# Patient Record
Sex: FEMALE | Race: ASIAN | NOT HISPANIC OR LATINO | Employment: OTHER | URBAN - METROPOLITAN AREA
[De-identification: names, ages, dates, MRNs, and addresses within clinical notes are randomized per-mention and may not be internally consistent; named-entity substitution may affect disease eponyms.]

---

## 2024-08-14 ENCOUNTER — APPOINTMENT (OUTPATIENT)
Dept: RADIOLOGY | Facility: HOSPITAL | Age: 64
End: 2024-08-14
Attending: STUDENT IN AN ORGANIZED HEALTH CARE EDUCATION/TRAINING PROGRAM
Payer: COMMERCIAL

## 2024-08-14 ENCOUNTER — HOSPITAL ENCOUNTER (INPATIENT)
Facility: HOSPITAL | Age: 64
LOS: 3 days | Discharge: HOME | End: 2024-08-17
Attending: STUDENT IN AN ORGANIZED HEALTH CARE EDUCATION/TRAINING PROGRAM | Admitting: STUDENT IN AN ORGANIZED HEALTH CARE EDUCATION/TRAINING PROGRAM
Payer: COMMERCIAL

## 2024-08-14 ENCOUNTER — ANESTHESIA EVENT (OUTPATIENT)
Dept: SURGERY | Facility: HOSPITAL | Age: 64
End: 2024-08-14
Payer: COMMERCIAL

## 2024-08-14 ENCOUNTER — ANESTHESIA (OUTPATIENT)
Dept: SURGERY | Facility: HOSPITAL | Age: 64
End: 2024-08-14
Payer: COMMERCIAL

## 2024-08-14 DIAGNOSIS — N20.1 URETERAL STONE: ICD-10-CM

## 2024-08-14 DIAGNOSIS — N17.9 AKI (ACUTE KIDNEY INJURY) (H): ICD-10-CM

## 2024-08-14 LAB
ALBUMIN UR-MCNC: NEGATIVE MG/DL
ANION GAP SERPL CALCULATED.3IONS-SCNC: 12 MMOL/L (ref 7–15)
APPEARANCE UR: CLEAR
BACTERIA #/AREA URNS HPF: ABNORMAL /HPF
BASOPHILS # BLD AUTO: 0 10E3/UL (ref 0–0.2)
BASOPHILS NFR BLD AUTO: 0 %
BILIRUB UR QL STRIP: NEGATIVE
BUN SERPL-MCNC: 46.1 MG/DL (ref 8–23)
CALCIUM SERPL-MCNC: 9 MG/DL (ref 8.8–10.4)
CHLORIDE SERPL-SCNC: 100 MMOL/L (ref 98–107)
COLOR UR AUTO: COLORLESS
CREAT SERPL-MCNC: 4.48 MG/DL (ref 0.51–0.95)
CRP SERPL-MCNC: 26.6 MG/L
EGFRCR SERPLBLD CKD-EPI 2021: 10 ML/MIN/1.73M2
EOSINOPHIL # BLD AUTO: 0.2 10E3/UL (ref 0–0.7)
EOSINOPHIL NFR BLD AUTO: 2 %
ERYTHROCYTE [DISTWIDTH] IN BLOOD BY AUTOMATED COUNT: 12.1 % (ref 10–15)
GLUCOSE BLDC GLUCOMTR-MCNC: 100 MG/DL (ref 70–99)
GLUCOSE SERPL-MCNC: 103 MG/DL (ref 70–99)
GLUCOSE UR STRIP-MCNC: NEGATIVE MG/DL
HCO3 SERPL-SCNC: 24 MMOL/L (ref 22–29)
HCT VFR BLD AUTO: 36.6 % (ref 35–47)
HGB BLD-MCNC: 12.2 G/DL (ref 11.7–15.7)
HGB UR QL STRIP: ABNORMAL
HOLD SPECIMEN: NORMAL
HOLD SPECIMEN: NORMAL
IMM GRANULOCYTES # BLD: 0 10E3/UL
IMM GRANULOCYTES NFR BLD: 0 %
KETONES UR STRIP-MCNC: NEGATIVE MG/DL
LEUKOCYTE ESTERASE UR QL STRIP: ABNORMAL
LYMPHOCYTES # BLD AUTO: 1.3 10E3/UL (ref 0.8–5.3)
LYMPHOCYTES NFR BLD AUTO: 15 %
MCH RBC QN AUTO: 29.1 PG (ref 26.5–33)
MCHC RBC AUTO-ENTMCNC: 33.3 G/DL (ref 31.5–36.5)
MCV RBC AUTO: 87 FL (ref 78–100)
MONOCYTES # BLD AUTO: 0.7 10E3/UL (ref 0–1.3)
MONOCYTES NFR BLD AUTO: 8 %
NEUTROPHILS # BLD AUTO: 6.3 10E3/UL (ref 1.6–8.3)
NEUTROPHILS NFR BLD AUTO: 74 %
NITRATE UR QL: NEGATIVE
NRBC # BLD AUTO: 0 10E3/UL
NRBC BLD AUTO-RTO: 0 /100
PH UR STRIP: 6 [PH] (ref 5–7)
PLATELET # BLD AUTO: 166 10E3/UL (ref 150–450)
POTASSIUM SERPL-SCNC: 4.6 MMOL/L (ref 3.4–5.3)
RBC # BLD AUTO: 4.19 10E6/UL (ref 3.8–5.2)
RBC URINE: <1 /HPF
SODIUM SERPL-SCNC: 136 MMOL/L (ref 135–145)
SP GR UR STRIP: 1.01 (ref 1–1.03)
SQUAMOUS EPITHELIAL: 2 /HPF
UROBILINOGEN UR STRIP-MCNC: <2 MG/DL
WBC # BLD AUTO: 8.5 10E3/UL (ref 4–11)
WBC URINE: 5 /HPF

## 2024-08-14 PROCEDURE — 360N000082 HC SURGERY LEVEL 2 W/ FLUORO, PER MIN: Performed by: STUDENT IN AN ORGANIZED HEALTH CARE EDUCATION/TRAINING PROGRAM

## 2024-08-14 PROCEDURE — 250N000011 HC RX IP 250 OP 636: Performed by: ANESTHESIOLOGY

## 2024-08-14 PROCEDURE — 250N000013 HC RX MED GY IP 250 OP 250 PS 637: Performed by: STUDENT IN AN ORGANIZED HEALTH CARE EDUCATION/TRAINING PROGRAM

## 2024-08-14 PROCEDURE — 999N000180 XR SURGERY CARM FLUORO LESS THAN 5 MIN: Mod: TC

## 2024-08-14 PROCEDURE — 52332 CYSTOSCOPY AND TREATMENT: CPT | Mod: 50 | Performed by: STUDENT IN AN ORGANIZED HEALTH CARE EDUCATION/TRAINING PROGRAM

## 2024-08-14 PROCEDURE — 99223 1ST HOSP IP/OBS HIGH 75: CPT | Performed by: STUDENT IN AN ORGANIZED HEALTH CARE EDUCATION/TRAINING PROGRAM

## 2024-08-14 PROCEDURE — 99222 1ST HOSP IP/OBS MODERATE 55: CPT | Mod: 25 | Performed by: STUDENT IN AN ORGANIZED HEALTH CARE EDUCATION/TRAINING PROGRAM

## 2024-08-14 PROCEDURE — 120N000001 HC R&B MED SURG/OB

## 2024-08-14 PROCEDURE — 87086 URINE CULTURE/COLONY COUNT: CPT | Performed by: STUDENT IN AN ORGANIZED HEALTH CARE EDUCATION/TRAINING PROGRAM

## 2024-08-14 PROCEDURE — 272N000001 HC OR GENERAL SUPPLY STERILE: Performed by: STUDENT IN AN ORGANIZED HEALTH CARE EDUCATION/TRAINING PROGRAM

## 2024-08-14 PROCEDURE — 52332 CYSTOSCOPY AND TREATMENT: CPT | Performed by: NURSE ANESTHETIST, CERTIFIED REGISTERED

## 2024-08-14 PROCEDURE — 250N000011 HC RX IP 250 OP 636: Performed by: STUDENT IN AN ORGANIZED HEALTH CARE EDUCATION/TRAINING PROGRAM

## 2024-08-14 PROCEDURE — 81001 URINALYSIS AUTO W/SCOPE: CPT | Performed by: STUDENT IN AN ORGANIZED HEALTH CARE EDUCATION/TRAINING PROGRAM

## 2024-08-14 PROCEDURE — 255N000002 HC RX 255 OP 636: Performed by: STUDENT IN AN ORGANIZED HEALTH CARE EDUCATION/TRAINING PROGRAM

## 2024-08-14 PROCEDURE — 370N000017 HC ANESTHESIA TECHNICAL FEE, PER MIN: Performed by: STUDENT IN AN ORGANIZED HEALTH CARE EDUCATION/TRAINING PROGRAM

## 2024-08-14 PROCEDURE — 250N000009 HC RX 250: Performed by: STUDENT IN AN ORGANIZED HEALTH CARE EDUCATION/TRAINING PROGRAM

## 2024-08-14 PROCEDURE — 36415 COLL VENOUS BLD VENIPUNCTURE: CPT | Performed by: STUDENT IN AN ORGANIZED HEALTH CARE EDUCATION/TRAINING PROGRAM

## 2024-08-14 PROCEDURE — C2617 STENT, NON-COR, TEM W/O DEL: HCPCS | Performed by: STUDENT IN AN ORGANIZED HEALTH CARE EDUCATION/TRAINING PROGRAM

## 2024-08-14 PROCEDURE — 258N000001 HC RX 258: Performed by: STUDENT IN AN ORGANIZED HEALTH CARE EDUCATION/TRAINING PROGRAM

## 2024-08-14 PROCEDURE — 74420 UROGRAPHY RTRGR +-KUB: CPT | Mod: 26 | Performed by: STUDENT IN AN ORGANIZED HEALTH CARE EDUCATION/TRAINING PROGRAM

## 2024-08-14 PROCEDURE — 86140 C-REACTIVE PROTEIN: CPT | Performed by: STUDENT IN AN ORGANIZED HEALTH CARE EDUCATION/TRAINING PROGRAM

## 2024-08-14 PROCEDURE — 0T788DZ DILATION OF BILATERAL URETERS WITH INTRALUMINAL DEVICE, VIA NATURAL OR ARTIFICIAL OPENING ENDOSCOPIC: ICD-10-PCS | Performed by: STUDENT IN AN ORGANIZED HEALTH CARE EDUCATION/TRAINING PROGRAM

## 2024-08-14 PROCEDURE — 85025 COMPLETE CBC W/AUTO DIFF WBC: CPT | Performed by: STUDENT IN AN ORGANIZED HEALTH CARE EDUCATION/TRAINING PROGRAM

## 2024-08-14 PROCEDURE — 80048 BASIC METABOLIC PNL TOTAL CA: CPT | Performed by: STUDENT IN AN ORGANIZED HEALTH CARE EDUCATION/TRAINING PROGRAM

## 2024-08-14 PROCEDURE — 99285 EMERGENCY DEPT VISIT HI MDM: CPT

## 2024-08-14 PROCEDURE — C1769 GUIDE WIRE: HCPCS | Performed by: STUDENT IN AN ORGANIZED HEALTH CARE EDUCATION/TRAINING PROGRAM

## 2024-08-14 PROCEDURE — 258N000003 HC RX IP 258 OP 636: Performed by: ANESTHESIOLOGY

## 2024-08-14 PROCEDURE — 999N000141 HC STATISTIC PRE-PROCEDURE NURSING ASSESSMENT: Performed by: STUDENT IN AN ORGANIZED HEALTH CARE EDUCATION/TRAINING PROGRAM

## 2024-08-14 PROCEDURE — 52332 CYSTOSCOPY AND TREATMENT: CPT | Performed by: ANESTHESIOLOGY

## 2024-08-14 PROCEDURE — 250N000009 HC RX 250: Performed by: ANESTHESIOLOGY

## 2024-08-14 DEVICE — URETERAL STENT
Type: IMPLANTABLE DEVICE | Site: URETER | Status: NON-FUNCTIONAL
Brand: PERCUFLEX™ PLUS
Removed: 2024-09-12

## 2024-08-14 RX ORDER — FENTANYL CITRATE 50 UG/ML
INJECTION, SOLUTION INTRAMUSCULAR; INTRAVENOUS PRN
Status: DISCONTINUED | OUTPATIENT
Start: 2024-08-14 | End: 2024-08-14

## 2024-08-14 RX ORDER — OXYCODONE HYDROCHLORIDE 5 MG/1
10 TABLET ORAL
Status: DISCONTINUED | OUTPATIENT
Start: 2024-08-14 | End: 2024-08-14 | Stop reason: HOSPADM

## 2024-08-14 RX ORDER — ONDANSETRON 2 MG/ML
4 INJECTION INTRAMUSCULAR; INTRAVENOUS EVERY 6 HOURS PRN
Status: DISCONTINUED | OUTPATIENT
Start: 2024-08-14 | End: 2024-08-17 | Stop reason: HOSPADM

## 2024-08-14 RX ORDER — POLYETHYLENE GLYCOL 3350 17 G/17G
17 POWDER, FOR SOLUTION ORAL 2 TIMES DAILY PRN
Status: DISCONTINUED | OUTPATIENT
Start: 2024-08-14 | End: 2024-08-17 | Stop reason: HOSPADM

## 2024-08-14 RX ORDER — FENTANYL CITRATE 50 UG/ML
25 INJECTION, SOLUTION INTRAMUSCULAR; INTRAVENOUS EVERY 5 MIN PRN
Status: DISCONTINUED | OUTPATIENT
Start: 2024-08-14 | End: 2024-08-14 | Stop reason: HOSPADM

## 2024-08-14 RX ORDER — LIDOCAINE 40 MG/G
CREAM TOPICAL
Status: DISCONTINUED | OUTPATIENT
Start: 2024-08-14 | End: 2024-08-14 | Stop reason: HOSPADM

## 2024-08-14 RX ORDER — OXYCODONE HYDROCHLORIDE 5 MG/1
5 TABLET ORAL
Status: DISCONTINUED | OUTPATIENT
Start: 2024-08-14 | End: 2024-08-14 | Stop reason: HOSPADM

## 2024-08-14 RX ORDER — AMOXICILLIN 250 MG
2 CAPSULE ORAL 2 TIMES DAILY PRN
Status: DISCONTINUED | OUTPATIENT
Start: 2024-08-14 | End: 2024-08-17 | Stop reason: HOSPADM

## 2024-08-14 RX ORDER — ACETAMINOPHEN 650 MG/1
650 SUPPOSITORY RECTAL EVERY 4 HOURS PRN
Status: DISCONTINUED | OUTPATIENT
Start: 2024-08-14 | End: 2024-08-17 | Stop reason: HOSPADM

## 2024-08-14 RX ORDER — SODIUM CHLORIDE, SODIUM LACTATE, POTASSIUM CHLORIDE, CALCIUM CHLORIDE 600; 310; 30; 20 MG/100ML; MG/100ML; MG/100ML; MG/100ML
INJECTION, SOLUTION INTRAVENOUS CONTINUOUS
Status: DISCONTINUED | OUTPATIENT
Start: 2024-08-14 | End: 2024-08-14 | Stop reason: HOSPADM

## 2024-08-14 RX ORDER — LIDOCAINE 40 MG/G
CREAM TOPICAL
Status: DISCONTINUED | OUTPATIENT
Start: 2024-08-14 | End: 2024-08-17 | Stop reason: HOSPADM

## 2024-08-14 RX ORDER — CEFAZOLIN SODIUM/WATER 2 G/20 ML
2 SYRINGE (ML) INTRAVENOUS
Status: COMPLETED | OUTPATIENT
Start: 2024-08-14 | End: 2024-08-14

## 2024-08-14 RX ORDER — ONDANSETRON 2 MG/ML
INJECTION INTRAMUSCULAR; INTRAVENOUS PRN
Status: DISCONTINUED | OUTPATIENT
Start: 2024-08-14 | End: 2024-08-14

## 2024-08-14 RX ORDER — HYDRALAZINE HYDROCHLORIDE 20 MG/ML
10 INJECTION INTRAMUSCULAR; INTRAVENOUS EVERY 4 HOURS PRN
Status: DISCONTINUED | OUTPATIENT
Start: 2024-08-14 | End: 2024-08-17 | Stop reason: HOSPADM

## 2024-08-14 RX ORDER — DEXAMETHASONE SODIUM PHOSPHATE 4 MG/ML
4 INJECTION, SOLUTION INTRA-ARTICULAR; INTRALESIONAL; INTRAMUSCULAR; INTRAVENOUS; SOFT TISSUE
Status: DISCONTINUED | OUTPATIENT
Start: 2024-08-14 | End: 2024-08-14 | Stop reason: HOSPADM

## 2024-08-14 RX ORDER — ONDANSETRON 4 MG/1
4 TABLET, ORALLY DISINTEGRATING ORAL EVERY 6 HOURS PRN
Status: DISCONTINUED | OUTPATIENT
Start: 2024-08-14 | End: 2024-08-17 | Stop reason: HOSPADM

## 2024-08-14 RX ORDER — NALOXONE HYDROCHLORIDE 1 MG/ML
0.1 INJECTION INTRAMUSCULAR; INTRAVENOUS; SUBCUTANEOUS
Status: DISCONTINUED | OUTPATIENT
Start: 2024-08-14 | End: 2024-08-14 | Stop reason: HOSPADM

## 2024-08-14 RX ORDER — ACETAMINOPHEN 325 MG/1
650 TABLET ORAL EVERY 4 HOURS PRN
Status: DISCONTINUED | OUTPATIENT
Start: 2024-08-14 | End: 2024-08-17 | Stop reason: HOSPADM

## 2024-08-14 RX ORDER — HYDRALAZINE HYDROCHLORIDE 10 MG/1
10 TABLET, FILM COATED ORAL EVERY 4 HOURS PRN
Status: DISCONTINUED | OUTPATIENT
Start: 2024-08-14 | End: 2024-08-17 | Stop reason: HOSPADM

## 2024-08-14 RX ORDER — ONDANSETRON 2 MG/ML
4 INJECTION INTRAMUSCULAR; INTRAVENOUS EVERY 30 MIN PRN
Status: DISCONTINUED | OUTPATIENT
Start: 2024-08-14 | End: 2024-08-14 | Stop reason: HOSPADM

## 2024-08-14 RX ORDER — ONDANSETRON 4 MG/1
4 TABLET, ORALLY DISINTEGRATING ORAL EVERY 30 MIN PRN
Status: DISCONTINUED | OUTPATIENT
Start: 2024-08-14 | End: 2024-08-14 | Stop reason: HOSPADM

## 2024-08-14 RX ORDER — PROPOFOL 10 MG/ML
INJECTION, EMULSION INTRAVENOUS PRN
Status: DISCONTINUED | OUTPATIENT
Start: 2024-08-14 | End: 2024-08-14

## 2024-08-14 RX ORDER — CEFAZOLIN SODIUM/WATER 2 G/20 ML
2 SYRINGE (ML) INTRAVENOUS SEE ADMIN INSTRUCTIONS
Status: DISCONTINUED | OUTPATIENT
Start: 2024-08-14 | End: 2024-08-14 | Stop reason: HOSPADM

## 2024-08-14 RX ORDER — LIDOCAINE HYDROCHLORIDE 10 MG/ML
INJECTION, SOLUTION INFILTRATION; PERINEURAL PRN
Status: DISCONTINUED | OUTPATIENT
Start: 2024-08-14 | End: 2024-08-14

## 2024-08-14 RX ORDER — ACETAMINOPHEN 650 MG/1
650 SUPPOSITORY RECTAL ONCE
Status: COMPLETED | OUTPATIENT
Start: 2024-08-14 | End: 2024-08-14

## 2024-08-14 RX ORDER — PROPOFOL 10 MG/ML
INJECTION, EMULSION INTRAVENOUS CONTINUOUS PRN
Status: DISCONTINUED | OUTPATIENT
Start: 2024-08-14 | End: 2024-08-14

## 2024-08-14 RX ORDER — FENTANYL CITRATE 50 UG/ML
50 INJECTION, SOLUTION INTRAMUSCULAR; INTRAVENOUS EVERY 5 MIN PRN
Status: DISCONTINUED | OUTPATIENT
Start: 2024-08-14 | End: 2024-08-14 | Stop reason: HOSPADM

## 2024-08-14 RX ORDER — AMOXICILLIN 250 MG
1 CAPSULE ORAL 2 TIMES DAILY PRN
Status: DISCONTINUED | OUTPATIENT
Start: 2024-08-14 | End: 2024-08-17 | Stop reason: HOSPADM

## 2024-08-14 RX ORDER — CALCIUM CARBONATE 500 MG/1
1000 TABLET, CHEWABLE ORAL 4 TIMES DAILY PRN
Status: DISCONTINUED | OUTPATIENT
Start: 2024-08-14 | End: 2024-08-17 | Stop reason: HOSPADM

## 2024-08-14 RX ORDER — ACETAMINOPHEN 325 MG/1
975 TABLET ORAL ONCE
Status: COMPLETED | OUTPATIENT
Start: 2024-08-14 | End: 2024-08-14

## 2024-08-14 RX ADMIN — LIDOCAINE HYDROCHLORIDE 2 ML: 10 INJECTION, SOLUTION INFILTRATION; PERINEURAL at 18:04

## 2024-08-14 RX ADMIN — ACETAMINOPHEN 975 MG: 325 TABLET ORAL at 17:50

## 2024-08-14 RX ADMIN — FENTANYL CITRATE 50 MCG: 50 INJECTION INTRAMUSCULAR; INTRAVENOUS at 18:15

## 2024-08-14 RX ADMIN — Medication 2 G: at 18:03

## 2024-08-14 RX ADMIN — SODIUM CHLORIDE, POTASSIUM CHLORIDE, SODIUM LACTATE AND CALCIUM CHLORIDE: 600; 310; 30; 20 INJECTION, SOLUTION INTRAVENOUS at 17:52

## 2024-08-14 RX ADMIN — ONDANSETRON 4 MG: 2 INJECTION INTRAMUSCULAR; INTRAVENOUS at 18:11

## 2024-08-14 RX ADMIN — PROPOFOL 75 MCG/KG/MIN: 10 INJECTION, EMULSION INTRAVENOUS at 18:11

## 2024-08-14 RX ADMIN — PROPOFOL 50 MG: 10 INJECTION, EMULSION INTRAVENOUS at 18:04

## 2024-08-14 ASSESSMENT — COLUMBIA-SUICIDE SEVERITY RATING SCALE - C-SSRS
2. HAVE YOU ACTUALLY HAD ANY THOUGHTS OF KILLING YOURSELF IN THE PAST MONTH?: NO
1. IN THE PAST MONTH, HAVE YOU WISHED YOU WERE DEAD OR WISHED YOU COULD GO TO SLEEP AND NOT WAKE UP?: NO
6. HAVE YOU EVER DONE ANYTHING, STARTED TO DO ANYTHING, OR PREPARED TO DO ANYTHING TO END YOUR LIFE?: NO

## 2024-08-14 ASSESSMENT — ACTIVITIES OF DAILY LIVING (ADL)
ADLS_ACUITY_SCORE: 35

## 2024-08-14 NOTE — ED NOTES
Expected Patient Referral to ED  10:50 AM    Referring Clinic/Provider:  Stevie SANCHEZ    Reason for referral/Clinical facts:  3-4 days of flank pain, 20 mm ureteral stone with hydro. ISIAH with elevated CR. Vitals are normal and pain well controlled. Needs admission for IVF and urology consult. Tried direct admit but no beds.     Recommendations provided:  Send to ED for further evaluation    Caller was informed that this institution does possess the capabilities and/or resources to provide for patient and should be transferred to our facility.    Discussed that if direct admit is sought and any hurdles are encountered, this ED would be happy to see the patient and evaluate.    Informed caller that recommendations provided are recommendations based only on the facts provided and that they responsible to accept or reject the advice, or to seek a formal in person consultation as needed and that this ED will see/treat patient should they arrive.      LINWOOD NORIEGA MD  [facility] EMERGENCY DEPARTMENT  [address] [place] [id number]  [phone]       Linwood Noriega MD  08/14/24 4742

## 2024-08-14 NOTE — H&P
[facility]    History and Physical - Hospitalist Service       Date of Admission:  8/14/2024    Assessment & Plan      Zoraida Marie is a 64 year old female with a medical history of nephrolithiasis who is admitted on 8/14/2024 with right hydronephrosis secondary to large obstructing stone.  Case is complicated by ISIAH.    Right hydronephrosis  Obstructing UPJ stone  -Consult urology, plan on stent placement this evening  -stone work-up per urology   -N.p.o. prior to procedure, advance as tolerated thereafter  -Trend creatinine with morning labs  -Pain control with Tylenol  -Withhold antibiotics unless fevers or other signs and symptoms of infection develop  -urine cx pending from OR     ISIAH  -secondary to obstructive uropathy, anticipate resolution s/p stent placement  -bladder scan as needed   -advise good oral intake post-procedure     Chronic conditions: patient denies chronic conditions, does not take any scheduled meds at home           Diet: Renal Diet (non-dialysis)    DVT Prophylaxis: SCD  Ackerman Catheter: Not present  Lines: None     Cardiac Monitoring: None  Code Status: Full Code      Clinically Significant Risk Factors Present on Admission                                           Disposition Plan     Medically Ready for Discharge: Anticipated Tomorrow           Otf Layton DO  Hospitalist Service  [facility]  Securely message with BeehiveID (more info)  Text page via MeBeam Paging/Directory     ______________________________________________________________________    Chief Complaint   Flank pain    History is obtained from the patient    History of Present Illness   Zoraida Marie is a 64 year old female with known medical history of nephrolithiasis otherwise without any significant past medical history who is presenting with severe right-sided flank pain that began this past Sunday.  The pain was progressive and ultimately brought patient into the ER today.  He  describes the pain as a intermittently sharp and constantly dull sensation that did not improve or worsen with anything in particular.  The pain was located on her right flank and radiated towards her groin.  She states some slight dull pain on left side as well.  Patient does admit to subjective fevers at home however on further questioning states this may have been related to the pain itself.  She denies any chills and diaphoresis throughout this time.  Denies any nausea or vomiting.    Past Medical History    History reviewed. No pertinent past medical history.    Past Surgical History   History reviewed. No pertinent surgical history.    Prior to Admission Medications   None        Social History   I have reviewed this patient's social history and updated it with pertinent information if needed.  Social History     Tobacco Use    Smoking status: Never    Smokeless tobacco: Never   Substance Use Topics    Alcohol use: Never    Drug use: Never         Family History         Allergies   No Known Allergies     Physical Exam   Vital Signs: Temp: 99.2  F (37.3  C) Temp src: Oral BP: 110/57 Pulse: 68   Resp: 18 SpO2: 96 % O2 Device: None (Room air)    Weight: 115 lbs 0 oz    General Appearance: Comfortable appearing, nontoxic  Respiratory: CTAB, breathing comfortably on room air  Cardiovascular: RRR without murmur  GI: No CVA tenderness, no abdominal pain, no lymphadenopathy  Skin: No exposed rash  Other: Alert and oriented    Medical Decision Making       75 MINUTES SPENT BY ME on the date of service doing chart review, history, exam, documentation & further activities per the note.      Data     I have personally reviewed the following data over the past 24 hrs:    8.5  \   12.2   / 166     136 100 46.1 (H) /  100 (H)   4.6 24 4.48 (H) \     Procal: N/A CRP: 26.60 (H) Lactic Acid: N/A         Imaging results reviewed over the past 24 hrs:   Recent Results (from the past 24 hour(s))   CT ABDOMEN PELVIS STONE PROTOCOL  WO    Narrative    For Patients: As a result of the 21st Century Cures Act, medical imaging exams and procedure reports are released immediately into your electronic medical record. You may view this report before your referring provider. If you have questions, please contact your health care provider.    EXAM: CT ABDOMEN PELVIS STONE PROTOCOL WO  LOCATION: [facility]  DATE: 8/14/2024    INDICATION: Flank pain, kidney stone suspected  COMPARISON: None.  TECHNIQUE: CT scan of the abdomen and pelvis was performed without oral or IV contrast. Multiplanar reformats were obtained. Dose reduction techniques were used.  CONTRAST: None.    FINDINGS:   LOWER CHEST: Normal.    HEPATOBILIARY: Normal.    PANCREAS: Normal.    SPLEEN: Normal.    ADRENAL GLANDS: Normal.    KIDNEY/BLADDER: Right hydronephrosis extending to the ureteropelvic junction, where there is a 20 mm x 7 mm x 9 mm calculus. There is a 15 mm x 15 mm x 18 mm calculus at the upper pole of the right kidney. There is low-attenuation in the upper pole of the right kidney. The left kidney is mildly atrophic. There is a 28 mm x 12 mm x 15 mm left renal pelvis calculus. No definite hydronephrosis or hydroureter.    BOWEL: Normal.    LYMPH NODES: Normal.    VASCULATURE: Normal.    PELVIC ORGANS: There is a 1.7 cm low-attenuation left ovarian lesion.    OTHER: There is a small amount of free fluid in the pelvis.    MUSCULOSKELETAL: Normal.    Impression    1.  Right hydronephrosis extending to the ureteropelvic junction, where there is a 20 mm x 7 mm x 9 mm calculus.  2.  Large bilateral renal calculi. Low-attenuation at the upper pole of the right kidney could be seen with focal obstruction.  3.  A 1.7 cm low-attenuation left ovarian lesion is likely a benign cyst. No follow-up is required.  4.  A small amount of free fluid in the pelvis is nonspecific. This is abnormal.   XR Surgery CAROLINA Fluoro L/T 5 Min    Narrative    This exam was marked as  non-reportable because it will not be read by a   radiologist or a [facility] [place] non-radiologist provider.

## 2024-08-14 NOTE — CONSULTS
Urology Consult    Name:  Zoraida Marie  MRN:  [id number]  Age/: 64 year old, 1960    CC: right flank pain     HPI: Zoraida Marie is a(n) 64 year old female with h/o right flank pain.  She had a recent onset of right-sided flank pain for the last few days and was seen at the urgent care earlier today and then referred to the ER.  CT was done in urgent care showing bilateral kidney stones obstructing on the right side and then without significant hydronephrosis but obstructing on the left side.  Was found to have an elevated creatinine.  Her baseline creatinine from  is at 1.77 and currently it was at 4.4  She was evaluated about a year ago at [facility] for kidney stones and had an ultrasound done which showed left-sided hydronephrosis but no kidney stones were seen at that time  No prior history of kidney stone procedures      Past Medical History:  History reviewed. No pertinent past medical history.    Past Surgical History:  History reviewed. No pertinent surgical history.    Allergies:   No Known Allergies    Medications:  Current Facility-Administered Medications   Medication Dose Route Frequency Provider Last Rate Last Admin    acetaminophen (TYLENOL) tablet 650 mg  650 mg Oral Q4H PRN Layton, Otf, DO        Or    acetaminophen (TYLENOL) Suppository 650 mg  650 mg Rectal Q4H PRN Layton, Otf, DO        calcium carbonate (TUMS) chewable tablet 1,000 mg  1,000 mg Oral 4x Daily PRN Layton, Otf, DO        hydrALAZINE (APRESOLINE) tablet 10 mg  10 mg Oral Q4H PRN Layton, Otf, DO        Or    hydrALAZINE (APRESOLINE) injection 10 mg  10 mg Intravenous Q4H PRN Layton, Otf, DO        lidocaine (LMX4) cream   Topical Q1H PRN Layton, Otf, DO        lidocaine 1 % 0.1-1 mL  0.1-1 mL Other Q1H PRN Layton, Otf, DO        melatonin tablet 5 mg  5 mg Oral At Bedtime PRN Layton, Otf, DO        ondansetron (ZOFRAN ODT) ODT tab 4 mg  4 mg Oral Q6H PRN Layton, Otf, DO        Or    ondansetron  (ZOFRAN) injection 4 mg  4 mg Intravenous Q6H PRN Layton, Otf, DO        polyethylene glycol (MIRALAX) Packet 17 g  17 g Oral BID PRN Layton, Otf, DO        senna-docusate (SENOKOT-S/PERICOLACE) 8.6-50 MG per tablet 1 tablet  1 tablet Oral BID PRN Layton, Otf, DO        Or    senna-docusate (SENOKOT-S/PERICOLACE) 8.6-50 MG per tablet 2 tablet  2 tablet Oral BID PRN Layton, Otf, DO        sodium chloride (PF) 0.9% PF flush 3 mL  3 mL Intracatheter Q8H Layton, Otf, DO        sodium chloride (PF) 0.9% PF flush 3 mL  3 mL Intracatheter q1 min prn Layton, Otf, DO           Social History:  Social History     Socioeconomic History    Marital status:      Spouse name: Not on file    Number of children: Not on file    Years of education: Not on file    Highest education level: Not on file   Occupational History    Not on file   Tobacco Use    Smoking status: Never    Smokeless tobacco: Never   Substance and Sexual Activity    Alcohol use: Never    Drug use: Never    Sexual activity: Not on file   Other Topics Concern    Not on file   Social History Narrative    Not on file     Social Determinants of Health     Financial Resource Strain: Not on file   Food Insecurity: Not on file   Transportation Needs: Not on file   Physical Activity: Not on file   Stress: Not on file   Social Connections: Unknown (7/12/2023)    Received from [facility]    Social Connections     Frequency of Communication with Friends and Family: Not on file   Interpersonal Safety: Not on file   Housing Stability: Not on file       Family History:  History reviewed. No pertinent family history.    ROS:  The remainder of the complete ROS was negative unless noted in the HPI.    Exam:  /62 (BP Location: Left arm)   Pulse 80   Temp 99.4  F (37.4  C) (Oral)   Resp 16   Wt 52.2 kg (115 lb)   SpO2 96%   General: Alert, interactive, & in NAD  Resp: CTAB, no crackles or wheezes  Cardiac:  Regular rate; extremities warm;   Abdomen: Soft, nontender, nondistended. .  : Normal   Extremities: No LE edema or obvious joint abnormalities  Skin: Warm and dry, no jaundice or rash    Labs:  Results for orders placed or performed during the hospital encounter of 08/14/24 (from the past 24 hour(s))   UA with Microscopic reflex to Culture    Specimen: Urine, Clean Catch   Result Value Ref Range    Color Urine Colorless Colorless, Straw, Light Yellow, Yellow    Appearance Urine Clear Clear    Glucose Urine Negative Negative mg/dL    Bilirubin Urine Negative Negative    Ketones Urine Negative Negative mg/dL    Specific Gravity Urine 1.006 1.001 - 1.030    Blood Urine 0.06 mg/dL (A) Negative    pH Urine 6.0 5.0 - 7.0    Protein Albumin Urine Negative Negative mg/dL    Urobilinogen Urine <2.0 <2.0 mg/dL    Nitrite Urine Negative Negative    Leukocyte Esterase Urine 75 Kallie/uL (A) Negative    Bacteria Urine Few (A) None Seen /HPF    RBC Urine <1 <=2 /HPF    WBC Urine 5 <=5 /HPF    Squamous Epithelials Urine 2 (H) <=1 /HPF    Narrative    Urine Culture not indicated   CBC with Platelets & Differential    Narrative    The following orders were created for panel order CBC with Platelets & Differential.  Procedure                               Abnormality         Status                     ---------                               -----------         ------                     CBC with platelets and d...[[id number]]                      Final result                 Please view results for these tests on the individual orders.   Basic metabolic panel   Result Value Ref Range    Sodium 136 135 - 145 mmol/L    Potassium 4.6 3.4 - 5.3 mmol/L    Chloride 100 98 - 107 mmol/L    Carbon Dioxide (CO2) 24 22 - 29 mmol/L    Anion Gap 12 7 - 15 mmol/L    Urea Nitrogen 46.1 (H) 8.0 - 23.0 mg/dL    Creatinine 4.48 (H) 0.51 - 0.95 mg/dL    GFR Estimate 10 (L) >60 mL/min/1.73m2    Calcium 9.0 8.8 - 10.4 mg/dL    Glucose 103 (H) 70 - 99  mg/dL   [place] Draw    Narrative    The following orders were created for panel order [place] Draw.  Procedure                               Abnormality         Status                     ---------                               -----------         ------                     Extra Red Top Tube[[id number]]                               Final result               Extra Green Top (Lithium...[[id number]]                                                 Extra Purple Top Tube[[id number]]                                                         Please view results for these tests on the individual orders.   CBC with platelets and differential   Result Value Ref Range    WBC Count 8.5 4.0 - 11.0 10e3/uL    RBC Count 4.19 3.80 - 5.20 10e6/uL    Hemoglobin 12.2 11.7 - 15.7 g/dL    Hematocrit 36.6 35.0 - 47.0 %    MCV 87 78 - 100 fL    MCH 29.1 26.5 - 33.0 pg    MCHC 33.3 31.5 - 36.5 g/dL    RDW 12.1 10.0 - 15.0 %    Platelet Count 166 150 - 450 10e3/uL    % Neutrophils 74 %    % Lymphocytes 15 %    % Monocytes 8 %    % Eosinophils 2 %    % Basophils 0 %    % Immature Granulocytes 0 %    NRBCs per 100 WBC 0 <1 /100    Absolute Neutrophils 6.3 1.6 - 8.3 10e3/uL    Absolute Lymphocytes 1.3 0.8 - 5.3 10e3/uL    Absolute Monocytes 0.7 0.0 - 1.3 10e3/uL    Absolute Eosinophils 0.2 0.0 - 0.7 10e3/uL    Absolute Basophils 0.0 0.0 - 0.2 10e3/uL    Absolute Immature Granulocytes 0.0 <=0.4 10e3/uL    Absolute NRBCs 0.0 10e3/uL   Extra Red Top Tube   Result Value Ref Range    Hold Specimen JIC    Extra Tube ([place] Draw)    Narrative    The following orders were created for panel order Extra Tube ([place] Draw).  Procedure                               Abnormality         Status                     ---------                               -----------         ------                     Extra Blue Top Tube[[id number]]                              Final result                 Please view results for these tests on the individual orders.   Extra  Blue Top Tube   Result Value Ref Range    Hold Specimen JIC    CRP inflammation   Result Value Ref Range    CRP Inflammation 26.60 (H) <5.00 mg/L   Glucose by meter   Result Value Ref Range    GLUCOSE BY METER POCT 100 (H) 70 - 99 mg/dL   XR Surgery CAROLINA Fluoro L/T 5 Min    Narrative    This exam was marked as non-reportable because it will not be read by a   radiologist or a [place] non-radiologist provider.             Imaging: I reviewed the CT images from the urgent care and agree with the findings of the radiologist  Additionally I also noted that the left kidney appeared much smaller than the right side and both had obstructing stones at the UPJ  Hydronephrosis was prominent on the right as compared to the left        Assessment and Plan: Zoraida Marie is a(n) 64 year old female with ISIAH due to obstructive uropathy resulting from obstructing right proximal ureteral and left UPJ stone with significant hydronephrosis on the right and mild hydro on the left side with a small left kidney.  She was attended today by her family who helped in interpretation to [facility] for standing.  We discussed about the nature of her current predicament and the need for urinary drainage for both kidneys.  We talked about options of drainage including stents and nephrostomies and the fact that if stents are not feasible we would resort to nephrostomy placement with interventional radiology.  I also talked about discomfort related to stents, possibilities of urinary tract infections and the concerns of no intervention.  We also discussed about the need for a follow-up procedure as well as differential renal function assessment once her creatinine stabilizes to assess the function of the left kidney.  Based on the discussions we had she was agreeable to proceed ahead with cystoscopy and bilateral ureteral stent placement.    Recommendations:  1.  Plan for urgent ureteral stent placement bilaterally  2.  Serial renal function monitoring and  continued care with hospital medicine service  3.  Nephrology review  4.  Urology will set her up for a follow-up procedure in the next few weeks possibly for PCNL      Juma Thomas MD  [facility]

## 2024-08-14 NOTE — OP NOTE
OPERATIVE REPORT    PREOPERATIVE DIAGNOSIS: Bilateral kidney stones                                        Left UPJ stone with right Proximal ureteral stone and Bilateral hydronephrosis                                        ISIAH       POSTOPERATIVE DIAGNOSIS:  Same    PROCEDURES PERFORMED:   1. Cystourethroscopy with bilateral  retrograde pyelography  2. Placement of bilateral ureteral stent.  3. Intraoperative interpretation of fluoroscopic imaging.     STAFF SURGEON: Juma Thomas MD, present for entire case.     ANESTHESIA: MAC    ESTIMATED BLOOD LOSS: 0 mL.     DRAINS:  6 Fr 24 cm Double J Stent      OPERATIVE INDICATIONS:   Zoraida Marie is a 64 year old female who presented with a bilateral  obstructing  stones.  The patient was counseled on the alternatives, risks, and benefits and elected to proceed with the above stated procedure.    DESCRIPTION OF PROCEDURE:    After informed consent was obtained, the patient was taken to the operating room, and moved to the operating table.  After adequate anesthesia was induced, the patient was repositioned in dorsal lithotomy position and prepped and draped in the usual sterile fashion. A timeout was taken to confirm correct patient, procedure and laterality.     A 22-Mohawk cystoscope was inserted into a well lubricated urethra. The urethra was unremarkable.  The bladder was free of tumors, stones or diverticuli.  The media was clear.  Bilateral ureteral orifices were orthotopic.  A Sensor guidewire was advanced into the right renal pelvis with the aid of a 5-Mohawk open ended ureteral catheter. A urine sample was sent for culture.  A retrograde pyelogram demonstrated severe hydronephrosis and  filling defects corresponding to the ureteral and kidney stones.  The wire was replaced and a 6 Fr 24 cm Double J Stent was advanced over the guidewire under fluoroscopic guidance with a good curl in the renal pelvis and bladder.    We then performed the same procedure on the left  side with retrograde pyelogram and placed a stent in a similar fashion. There was moderate hydronephrosis on the retrograde pyelogram. The stent passed up easily with no appreciable resistance with appreciable curls in the bladder and the renal pelvis.  The bladder was then drained.    The patient tolerated the procedure well.  There were no complications.         PLAN:   - Admit overnight for monitoring  - Follow-up with Dr Thomas for definitive stone management in next 2-3 weeks

## 2024-08-14 NOTE — ED TRIAGE NOTES
Pt arrives via private car with her daughter. Pt is here after being dx with a kidney stone and renal function impairment. Pt has a hx of this and began to run a fever yesterday with chills and sweats. She has also had flank pain.      Triage Assessment (Adult)       Row Name 08/14/24 1225          Triage Assessment    Airway WDL WDL        Respiratory WDL    Respiratory WDL WDL        Skin Circulation/Temperature WDL    Skin Circulation/Temperature WDL X  chills and sweats        Cardiac WDL    Cardiac WDL WDL        Peripheral/Neurovascular WDL    Peripheral Neurovascular WDL WDL        Cognitive/Neuro/Behavioral WDL    Cognitive/Neuro/Behavioral WDL WDL

## 2024-08-14 NOTE — ANESTHESIA POSTPROCEDURE EVALUATION
Patient: Zoraida Marie    Procedure: Procedure(s):  CYSTOSCOPY, WITH bilateral RETROGRADE PYELOGRAM AND BILATERAL URETERAL STENT INSERTION       Anesthesia Type:  MAC    Note:  Disposition: Inpatient   Postop Pain Control: Uneventful            Sign Out: Well controlled pain   PONV: No   Neuro/Psych: Uneventful            Sign Out: Acceptable/Baseline neuro status   Airway/Respiratory: Uneventful            Sign Out: Acceptable/Baseline resp. status   CV/Hemodynamics: Uneventful            Sign Out: Acceptable CV status; No obvious hypovolemia; No obvious fluid overload   Other NRE: NONE   DID A NON-ROUTINE EVENT OCCUR? No       Last vitals:  Vitals:    08/14/24 1225 08/14/24 1642   BP: 134/80 122/62   Pulse: 84 80   Resp: 20 16   Temp: 37.1  C (98.7  F) 37.4  C (99.4  F)   SpO2: 96% 96%       Electronically Signed By: Ronald Li MD  August 14, 2024  6:43 PM

## 2024-08-14 NOTE — MEDICATION SCRIBE - ADMISSION MEDICATION HISTORY
Medication Scribe Admission Medication History    Admission medication history is complete. The information provided in this note is only as accurate as the sources available at the time of the update.    Information Source(s): Patient and Family member via in-person    Pertinent Information: Patient's medications are being managed by son and daughter in law. (Son =  CHRIS Ford [phone], Daughter in law = Shilpa [phone]). Shilpa was bedside and was interviewed.     Patient reported to take no medications. Exception was made today when she took an un-identified dose of Tylenol and that was in the house.     Changes made to PTA medication list:  Added: None  Deleted: None  Changed: None    Allergies reviewed with patient and updates made in EHR: yes    Medication History Completed By: Gustavo Villalta 8/14/2024 3:59 PM    No outpatient medications have been marked as taking for the 8/14/24 encounter (Hospital Encounter).

## 2024-08-14 NOTE — ANESTHESIA CARE TRANSFER NOTE
Patient: Zoraida Marie    Procedure: Procedure(s):  CYSTOSCOPY, WITH bilateral RETROGRADE PYELOGRAM AND BILATERAL URETERAL STENT INSERTION       Diagnosis: Ureteral stone [N20.1]  ISIAH (acute kidney injury) (H24) [N17.9]  Diagnosis Additional Information: No value filed.    Anesthesia Type:   MAC     Note:    Oropharynx: oropharynx clear of all foreign objects and spontaneously breathing  Level of Consciousness: awake  Oxygen Supplementation: room air    Independent Airway: airway patency satisfactory and stable    Vital Signs Stable: post-procedure vital signs reviewed and stable  Report to RN Given: handoff report given  Patient transferred to: Telemetry/Step Down Unit    Handoff Report: Identifed the Patient, Identified the Reponsible Provider, Reviewed the pertinent medical history, Discussed the surgical course, Reviewed Intra-OP anesthesia mangement and issues during anesthesia, Set expectations for post-procedure period and Allowed opportunity for questions and acknowledgement of understanding      Vitals:  Vitals Value Taken Time   /58    Temp     Pulse 79    Resp 21    SpO2 99        Electronically Signed By: RALF Lipscomb CRNA  August 14, 2024  6:36 PM

## 2024-08-14 NOTE — ED PROVIDER NOTES
NAME: Zoraida Marie  AGE: 64 year old female  YOB: 1960  MRN: [id number]  EVALUATION DATE & TIME: No admission date for patient encounter.    PCP: Shilpa Ford See  ED PROVIDER: Lacie Hunt MD.    Chief Complaint   Patient presents with    Fever       FINAL IMPRESSION:  1. Ureteral stone    2. ISIAH (acute kidney injury) (H24)        MEDICAL DECISION MAKIN:26 PM I met with the patient, obtained history, performed an initial exam, and discussed options and plan for diagnostics and treatment here in the ED.   2:35 PM I spoke with Urology, BOBBY Nesbitt.  2:48 PM I spoke with Hospitalist, Dr. Layton.    MDM: 65 y/o F who presents with flank pain. She was seen [facility] today and CT scan showed right hydronephrosis with 20 mm x 7mmx 0 mm stone at the UPJ, other incidental findings noted. Creatine also found to be elevated at 4.42. Sent here for further evaluation. She reports subjective fevers at home but is afebrile here, UA does not appear infected and normal WBC. Vitals are reassuring and is nontoxic appearing on arrival. Urology (BOBBY) was consulted and plan for likely stent placement this evening, they did not recommend antibiotics at this time. She was accepted for admission by the hospitalist.     Medical Decision Making  Obtained supplemental history:Supplemental history obtained?: Documented in chart and Family Member/Significant Other  Reviewed external records: External records reviewed?: No  Care impacted by chronic illness:N/A  Care significantly affected by social determinants of health:N/A  Did you consider but not order tests?: Work up considered but not performed and documented in chart, if applicable  Did you interpret images independently?: Independent interpretation of ECG and images noted in documentation, when applicable.  Consultation discussion with other provider:Did you involve another provider (consultant, , pharmacy, etc.)?: I discussed the care with another health  care provider, see documentation for details.  Admit.    MEDICATIONS GIVEN IN THE EMERGENCY:  Medications   lidocaine 1 % 0.1-1 mL (has no administration in time range)   lidocaine (LMX4) cream (has no administration in time range)   sodium chloride (PF) 0.9% PF flush 3 mL (has no administration in time range)   sodium chloride (PF) 0.9% PF flush 3 mL (has no administration in time range)   senna-docusate (SENOKOT-S/PERICOLACE) 8.6-50 MG per tablet 1 tablet (has no administration in time range)     Or   senna-docusate (SENOKOT-S/PERICOLACE) 8.6-50 MG per tablet 2 tablet (has no administration in time range)   calcium carbonate (TUMS) chewable tablet 1,000 mg (has no administration in time range)   hydrALAZINE (APRESOLINE) tablet 10 mg (has no administration in time range)     Or   hydrALAZINE (APRESOLINE) injection 10 mg (has no administration in time range)   acetaminophen (TYLENOL) tablet 650 mg (has no administration in time range)     Or   acetaminophen (TYLENOL) Suppository 650 mg (has no administration in time range)   melatonin tablet 5 mg (has no administration in time range)   polyethylene glycol (MIRALAX) Packet 17 g (has no administration in time range)   ondansetron (ZOFRAN ODT) ODT tab 4 mg (has no administration in time range)     Or   ondansetron (ZOFRAN) injection 4 mg (has no administration in time range)       NEW PRESCRIPTIONS STARTED AT TODAY'S ER VISIT:  New Prescriptions    No medications on file        =================================================================  HPI    Patient information was obtained from: patient and family  Use of : Yes - Family member; Language - Jones    Zoraida Marie is a 64 year old female with no past pertinent medical history, who presents with abdominal pain and fever.     For the past few days, patient has had right abdominal pain that radiates to her right back as well as a subjective fever. This morning, patient went to urgent care, and they diagnosed a  enlarged kidney stone and referred patient to ED.     Patient denies vomiting, diarrhea, dysuria, and hematuria. Patient took Tylenol for pain last night. She last ate at 10 AM today. Patient is present with her family.    CT abd/pelvis WO:  1.  Right hydronephrosis extending to the ureteropelvic junction, where there is a 20 mm x 7 mm x 9 mm calculus.   2.  Large bilateral renal calculi. Low-attenuation at the upper pole of the right kidney could be seen with focal obstruction.   3.  A 1.7 cm low-attenuation left ovarian lesion is likely a benign cyst. No follow-up is required.   4.  A small amount of free fluid in the pelvis is nonspecific. This is abnormal.       PAST MEDICAL HISTORY:  No past medical history on file.    PAST SURGICAL HISTORY:  No past surgical history on file.    CURRENT MEDICATIONS:      Current Facility-Administered Medications:     acetaminophen (TYLENOL) tablet 650 mg, 650 mg, Oral, Q4H PRN **OR** acetaminophen (TYLENOL) Suppository 650 mg, 650 mg, Rectal, Q4H PRN, Otf Layton, DO    calcium carbonate (TUMS) chewable tablet 1,000 mg, 1,000 mg, Oral, 4x Daily PRN, Layton, Otf, DO    hydrALAZINE (APRESOLINE) tablet 10 mg, 10 mg, Oral, Q4H PRN **OR** hydrALAZINE (APRESOLINE) injection 10 mg, 10 mg, Intravenous, Q4H PRN, Layton, Otf, DO    lidocaine (LMX4) cream, , Topical, Q1H PRN, LaytonOtf, DO    lidocaine 1 % 0.1-1 mL, 0.1-1 mL, Other, Q1H PRN, Layton, Otf, DO    melatonin tablet 5 mg, 5 mg, Oral, At Bedtime PRN, LaytonBogdanOtf, DO    ondansetron (ZOFRAN ODT) ODT tab 4 mg, 4 mg, Oral, Q6H PRN **OR** ondansetron (ZOFRAN) injection 4 mg, 4 mg, Intravenous, Q6H PRN, Layton, Otf, DO    polyethylene glycol (MIRALAX) Packet 17 g, 17 g, Oral, BID PRN, Layton, Otf, DO    senna-docusate (SENOKOT-S/PERICOLACE) 8.6-50 MG per tablet 1 tablet, 1 tablet, Oral, BID PRN **OR** senna-docusate (SENOKOT-S/PERICOLACE) 8.6-50 MG per tablet 2 tablet, 2 tablet, Oral, BID PRN, Layton,  Otf, DO    sodium chloride (PF) 0.9% PF flush 3 mL, 3 mL, Intracatheter, Q8H, Layton Otf, DO    sodium chloride (PF) 0.9% PF flush 3 mL, 3 mL, Intracatheter, q1 min prn, Otf Layton, DO  No current outpatient medications on file.    ALLERGIES:  No Known Allergies    FAMILY HISTORY:  No family history on file.    SOCIAL HISTORY:   Social History     Socioeconomic History    Marital status:      Social Determinants of Health      Received from [facility] [facility]    [email or web address]       PHYSICAL EXAM:    Vitals: /80   Pulse 84   Temp 98.7  F (37.1  C) (Oral)   Resp 20   Wt 52.2 kg (115 lb)   SpO2 96%    Constitutional: Well developed, well nourished. Comfortable appearing. Able to walk back from the waiting room without issue  HENT: Normocephalic, atraumatic, mucous membranes moist, nose normal  Eyes: Pupils mid range, sclera white, no discharge  Neck- Gross ROM intact.   Respiratory: Normal work of breathing, normal rate, speaks in full sentences  Cardiovascular: Normal heart rate  Abdomen: soft, not distended, mild right abdominal and flank tenderness without guarding  Musculoskeletal: Moving all 4 extremities intentionally and without pain.  Neurologic: Alert, cranial nerves grossly intact. No focal deficits noted    LAB:  All pertinent labs reviewed and interpreted.  Labs Ordered and Resulted from Time of ED Arrival to Time of ED Departure   ROUTINE UA WITH MICROSCOPIC REFLEX TO CULTURE - Abnormal       Result Value    Color Urine Colorless      Appearance Urine Clear      Glucose Urine Negative      Bilirubin Urine Negative      Ketones Urine Negative      Specific Gravity Urine 1.006      Blood Urine 0.06 mg/dL (*)     pH Urine 6.0      Protein Albumin Urine Negative      Urobilinogen Urine <2.0      Nitrite Urine Negative      Leukocyte Esterase Urine 75 Kallie/uL (*)     Bacteria Urine Few (*)     RBC Urine <1      WBC Urine 5      Squamous Epithelials  Urine 2 (*)    BASIC METABOLIC PANEL - Abnormal    Sodium 136      Potassium 4.6      Chloride 100      Carbon Dioxide (CO2) 24      Anion Gap 12      Urea Nitrogen 46.1 (*)     Creatinine 4.48 (*)     GFR Estimate 10 (*)     Calcium 9.0      Glucose 103 (*)    CRP INFLAMMATION - Abnormal    CRP Inflammation 26.60 (*)    CBC WITH PLATELETS AND DIFFERENTIAL    WBC Count 8.5      RBC Count 4.19      Hemoglobin 12.2      Hematocrit 36.6      MCV 87      MCH 29.1      MCHC 33.3      RDW 12.1      Platelet Count 166      % Neutrophils 74      % Lymphocytes 15      % Monocytes 8      % Eosinophils 2      % Basophils 0      % Immature Granulocytes 0      NRBCs per 100 WBC 0      Absolute Neutrophils 6.3      Absolute Lymphocytes 1.3      Absolute Monocytes 0.7      Absolute Eosinophils 0.2      Absolute Basophils 0.0      Absolute Immature Granulocytes 0.0      Absolute NRBCs 0.0       I, Evon Moffett, am serving as a scribe to document services personally performed by Dr. Lacie Hunt based on my observation and the provider's statements to me. I, Lacie Hunt MD attest that Evon Moffett is acting in a scribe capacity, has observed my performance of the services and has documented them in accordance with my direction.    Lacie Hunt M.D.  Emergency Medicine  [facility] EMERGENCY DEPARTMENT  [facility] [facility] [address] [phone]  Dept: [phone]       Lacie Hunt MD  08/14/24 1922

## 2024-08-15 ENCOUNTER — VIRTUAL VISIT (OUTPATIENT)
Dept: INTERPRETER SERVICES | Facility: CLINIC | Age: 64
End: 2024-08-15
Payer: COMMERCIAL

## 2024-08-15 LAB
ANION GAP SERPL CALCULATED.3IONS-SCNC: 13 MMOL/L (ref 7–15)
BUN SERPL-MCNC: 42.2 MG/DL (ref 8–23)
CALCIUM SERPL-MCNC: 8.5 MG/DL (ref 8.8–10.4)
CHLORIDE SERPL-SCNC: 96 MMOL/L (ref 98–107)
CREAT SERPL-MCNC: 4.04 MG/DL (ref 0.51–0.95)
EGFRCR SERPLBLD CKD-EPI 2021: 12 ML/MIN/1.73M2
GLUCOSE SERPL-MCNC: 94 MG/DL (ref 70–99)
HCO3 SERPL-SCNC: 21 MMOL/L (ref 22–29)
HOLD SPECIMEN: NORMAL
HOLD SPECIMEN: NORMAL
POTASSIUM SERPL-SCNC: 4.9 MMOL/L (ref 3.4–5.3)
SODIUM SERPL-SCNC: 130 MMOL/L (ref 135–145)
URATE SERPL-MCNC: 9 MG/DL (ref 2.4–5.7)

## 2024-08-15 PROCEDURE — 36415 COLL VENOUS BLD VENIPUNCTURE: CPT | Performed by: INTERNAL MEDICINE

## 2024-08-15 PROCEDURE — 250N000013 HC RX MED GY IP 250 OP 250 PS 637: Performed by: STUDENT IN AN ORGANIZED HEALTH CARE EDUCATION/TRAINING PROGRAM

## 2024-08-15 PROCEDURE — 120N000001 HC R&B MED SURG/OB

## 2024-08-15 PROCEDURE — 258N000003 HC RX IP 258 OP 636: Performed by: INTERNAL MEDICINE

## 2024-08-15 PROCEDURE — 36415 COLL VENOUS BLD VENIPUNCTURE: CPT | Performed by: STUDENT IN AN ORGANIZED HEALTH CARE EDUCATION/TRAINING PROGRAM

## 2024-08-15 PROCEDURE — 83970 ASSAY OF PARATHORMONE: CPT | Performed by: INTERNAL MEDICINE

## 2024-08-15 PROCEDURE — 80048 BASIC METABOLIC PNL TOTAL CA: CPT | Performed by: STUDENT IN AN ORGANIZED HEALTH CARE EDUCATION/TRAINING PROGRAM

## 2024-08-15 PROCEDURE — 99233 SBSQ HOSP IP/OBS HIGH 50: CPT | Performed by: INTERNAL MEDICINE

## 2024-08-15 PROCEDURE — T1013 SIGN LANG/ORAL INTERPRETER: HCPCS | Mod: GT,TEL,95 | Performed by: INTERPRETER

## 2024-08-15 PROCEDURE — 84550 ASSAY OF BLOOD/URIC ACID: CPT | Performed by: INTERNAL MEDICINE

## 2024-08-15 RX ORDER — SODIUM CHLORIDE 9 MG/ML
INJECTION, SOLUTION INTRAVENOUS CONTINUOUS
Status: DISCONTINUED | OUTPATIENT
Start: 2024-08-15 | End: 2024-08-16

## 2024-08-15 RX ORDER — SODIUM CHLORIDE 9 MG/ML
INJECTION, SOLUTION INTRAVENOUS CONTINUOUS
Status: DISCONTINUED | OUTPATIENT
Start: 2024-08-15 | End: 2024-08-15

## 2024-08-15 RX ADMIN — SODIUM CHLORIDE: 9 INJECTION, SOLUTION INTRAVENOUS at 18:56

## 2024-08-15 RX ADMIN — ACETAMINOPHEN 650 MG: 325 TABLET ORAL at 00:33

## 2024-08-15 RX ADMIN — ACETAMINOPHEN 650 MG: 325 TABLET ORAL at 09:47

## 2024-08-15 RX ADMIN — ACETAMINOPHEN 650 MG: 325 TABLET ORAL at 20:32

## 2024-08-15 ASSESSMENT — ACTIVITIES OF DAILY LIVING (ADL)
ADLS_ACUITY_SCORE: 35
ADLS_ACUITY_SCORE: 35
ADLS_ACUITY_SCORE: 20
ADLS_ACUITY_SCORE: 35
ADLS_ACUITY_SCORE: 20
ADLS_ACUITY_SCORE: 35
ADLS_ACUITY_SCORE: 20
ADLS_ACUITY_SCORE: 35
ADLS_ACUITY_SCORE: 20
ADLS_ACUITY_SCORE: 35
ADLS_ACUITY_SCORE: 20
ADLS_ACUITY_SCORE: 35
ADLS_ACUITY_SCORE: 20
ADLS_ACUITY_SCORE: 20
ADLS_ACUITY_SCORE: 35
ADLS_ACUITY_SCORE: 20
ADLS_ACUITY_SCORE: 20
ADLS_ACUITY_SCORE: 35
ADLS_ACUITY_SCORE: 35
ADLS_ACUITY_SCORE: 20

## 2024-08-15 NOTE — PLAN OF CARE
Alert and oriented. Comfortable. Ambulating independently. Pt. Able to make needs known. Speaking hmong only, daughter in law assisting with communication. Denies pain and nausea. Went straight to surgery and returned. VSS other than soft BP which improved with each successive check. Denied nausea and pain. Tolerating clear liquids. Hat in toilet, and strainer ready.

## 2024-08-15 NOTE — PLAN OF CARE
Problem: Adult Inpatient Plan of Care  Goal: Optimal Comfort and Wellbeing  Outcome: Progressing     Problem: Adult Inpatient Plan of Care  Goal: Readiness for Transition of Care  Outcome: Progressing   Goal Outcome Evaluation:      Plan of Care Reviewed With: patient, child    Overall Patient Progress: improving       A&O x4.  Reports pain 5/10 to abdomin.  Managed with tylenol per MD.  Independent.  Filter urine.  Plan of care.

## 2024-08-15 NOTE — PROGRESS NOTES
[facility]    Medicine Progress Note - Hospitalist Service    Date of Admission:  8/14/2024    Assessment & Plan       Zoraida Marie is a 64 year old female with a medical history of nephrolithiasis who is admitted on 8/14/2024 with right hydronephrosis secondary to large obstructing stone.  Case is complicated by ISIAH.        8/14 :     S/p   1. Cystourethroscopy with bilateral  retrograde pyelography  2. Placement of bilateral ureteral stent.  3. Intraoperative interpretation of fluoroscopic imaging.               A/p :        Right hydronephrosis  Obstructing UPJ stone    -Consult urology, plan on stent placement this evening  -stone work-up per urology   -Pain control with Tylenol  -Withhold antibiotics unless fevers or other signs and symptoms of infection develop  -urine cx from OR : no growth    8/14 :     S/p   1. Cystourethroscopy with bilateral  retrograde pyelography  2. Placement of bilateral ureteral stent.  3. Intraoperative interpretation of fluoroscopic imaging.     Stable post procedure  Urology signed off        ISIAH    -secondary to obstructive uropathy, anticipate resolution s/p stent placement  Creatinine still high at 4.04 - post procedure- seems  acute  Continue iv fluids  Consult nephrology       Chronic conditions: patient denies chronic conditions, does not take any scheduled meds at home            BARRIERS TO DISCHARGE :       Discharge once renal functions stable and  ok  with nephrology                Diet: Renal Diet (non-dialysis)    DVT Prophylaxis: Pneumatic Compression Devices  Ackerman Catheter: Not present  Lines: None     Cardiac Monitoring: None  Code Status: Full Code      Clinically Significant Risk Factors                                             Disposition Plan         Medically Ready for Discharge: Anticipated Tomorrow             Han Resendiz MD  Hospitalist Service  [facility]  Securely message with [employer] (more info)  Text  page via [facility] Paging/Directory   ______________________________________________________________________      8/15 - UPDATES :       No fevers  Post op day 1  Creatinine still high at 4.04 - seems  acute  Continue iv fluids  Consult nephrology    Discharge once renal functions stable and  ok  with nephrology      Physical Exam   Vital Signs: Temp: 98.6  F (37  C) Temp src: Oral BP: 107/58 Pulse: 63   Resp: 18 SpO2: 95 % O2 Device: None (Room air)    Weight: 115 lbs 0 oz       GENERAL: The patient is not in any acute distressed. Awake and alert.  HEENT: Nonicteric sclerae, PERRLA, EOMI. Oropharynx clear. Moist mucous membranes. Conjunctivae appear well perfused.  HEART: Regular rate and rhythm without murmurs.  LUNGS: Clear to auscultation bilaterally. No wheezing or crackles.  ABDOMEN: Soft, positive bowel sounds, nontender.  SKIN: No rash, no excessive bruising, petechiae, or purpura.  EXTREMITIES : no rashes, no swelling in legs.  NEUROLOGIC: conscious and oriented, follows commands, no obvious focal deficits.  ROS: All other systems negative         Medical Decision Making       60 MINUTES SPENT BY ME on the date of service doing chart review, history, exam, documentation & further activities per the note.  MANAGEMENT DISCUSSED with the following over the past 24 hours: rn, patient       Data     I have personally reviewed the following data over the past 24 hrs:    N/A  \   N/A   / N/A     130 (L) 96 (L) 42.2 (H) /  94   4.9 21 (L) 4.04 (H) \

## 2024-08-15 NOTE — PROGRESS NOTES
Urology Progress Notes, Remote    I have reviewed the chart and called her on the phone  I was able to talk to her daughter-in-law was able to discuss his overall progress  She seems to be doing well except for some frequency and bladder spasms related to the ureteral stent  No fever good urine output, and improving renal function    Vital signs:  Temp: 98.6  F (37  C) Temp src: Oral BP: 107/58 Pulse: 63   Resp: 18 SpO2: 95 % O2 Device: None (Room air)     Weight: 52.2 kg (115 lb)  There is no height or weight on file to calculate BMI.    Last Comprehensive Metabolic Panel:  Lab Results   Component Value Date     (L) 08/15/2024    POTASSIUM 4.9 08/15/2024    CHLORIDE 96 (L) 08/15/2024    CO2 21 (L) 08/15/2024    ANIONGAP 13 08/15/2024    GLC 94 08/15/2024    BUN 42.2 (H) 08/15/2024    CR 4.04 (H) 08/15/2024    GFRESTIMATED 12 (L) 08/15/2024    RAFFAELE 8.5 (L) 08/15/2024     CBC RESULTS:   Recent Labs   Lab Test 08/14/24  1342   WBC 8.5   RBC 4.19   HGB 12.2   HCT 36.6   MCV 87   MCH 29.1   MCHC 33.3   RDW 12.1            Input/output      Recommendations:  1.  Appreciate excellent cares from the hospitalist team.  2.  Continue monitoring urine output and recovery of renal function  3.  She is likely to need percutaneous nephrolithotomy based on the size of the stones and we will arrange for appropriate follow-up to discuss surgery and schedule her for the same once he gets discharged  4.  Urology will continue to follow peripherally      Juma Thomas MD

## 2024-08-15 NOTE — CONSULTS
RENAL CONSULTATION:     Date of Consultation:  8/15/2024    Requesting Physician: Dr. Resendiz  Reason for Consult:  ISIAH/ nephrolithiasis    Assessment/ Recommendations:  ISIAH on CKD-3b. Cr 1.7 mg/dl in 8/2023 with extensive h/o kidney stones in [place] prior to 2023. Has been told of kidney weakness prior to immigrating here in 2023. Huge bilateral kidney stones with relative atrophy of left kidney on CT scan causative for CKD and new right sided obstruction causing ISIAH.   - Poor fluid intake noted - increase to 100 oz daily   - Resume IVF tonight to facilitate further improvement.   - Check PTH to eval for CKD assoc mineral and bone disorder.   - Will need outpt F/U at [facility] (They live in [place])    Bilateral nephrolithiasis with right hydronephrosis. S/P bilat stent placement 8/13/24 with outpt stone management per Urology.   - ~2 cm stones in each kidney with right sided hydro, left kidney with relative atrophy   - Mild stent pain at present but flank pain resolved.     Hyponatremia: Acute since admit. Resume NS 75 ml/hr given poor fluid intake overall.       This document is created with the help of Dragon dictation system. All grammatical errors are unintentional.     Raudel Eid MD  [facility]  [phone] (off)       History of present illness: Kianna is a 64-year-old ong woman who immigrated to [place]16/[place] and has an extensive history of nephrolithiasis but did not require previous surgeries while in [facility].  She was apparently told with weakness of her kidney function there however.  She does not like taking medications and denies any medicines prior to admission.  She presented to the emergency department 8/14/2024 with a 3 to 4-day history of increasing right flank pain with nausea.  She did not emergency room was found to have large bilateral kidney stones with right hydronephrosis and admitted to hospital with Cr 4.48 mg/dl. Only previous Cr was 1.73 mg/dl in  July 2023 with initial outpt visit at [facility]. Had bilat stents placed 8/13 and cr 4.0 mg/dl today. Currently pain with voiding in bladder area but flank pain resolved. No dizziness. No NSAIDs. Drinks 40-50 oz fluids daily.     History reviewed. No pertinent past medical history.      Current Facility-Administered Medications:     acetaminophen (TYLENOL) tablet 650 mg, 650 mg, Oral, Q4H PRN, 650 mg at 08/15/24 0947 **OR** acetaminophen (TYLENOL) Suppository 650 mg, 650 mg, Rectal, Q4H PRN, Juma Thomas MD    calcium carbonate (TUMS) chewable tablet 1,000 mg, 1,000 mg, Oral, 4x Daily PRN, Juma Thomas MD    hydrALAZINE (APRESOLINE) tablet 10 mg, 10 mg, Oral, Q4H PRN **OR** hydrALAZINE (APRESOLINE) injection 10 mg, 10 mg, Intravenous, Q4H PRN, Juma Thomas MD    lidocaine (LMX4) cream, , Topical, Q1H PRN, Juma Thomas MD    lidocaine 1 % 0.1-1 mL, 0.1-1 mL, Other, Q1H PRN, Juma Thomas MD    melatonin tablet 5 mg, 5 mg, Oral, At Bedtime PRN, Juma Thomas MD    ondansetron (ZOFRAN ODT) ODT tab 4 mg, 4 mg, Oral, Q6H PRN **OR** ondansetron (ZOFRAN) injection 4 mg, 4 mg, Intravenous, Q6H PRN, Juma Thomas MD    polyethylene glycol (MIRALAX) Packet 17 g, 17 g, Oral, BID PRN, Juma Thomas MD    senna-docusate (SENOKOT-S/PERICOLACE) 8.6-50 MG per tablet 1 tablet, 1 tablet, Oral, BID PRN **OR** senna-docusate (SENOKOT-S/PERICOLACE) 8.6-50 MG per tablet 2 tablet, 2 tablet, Oral, BID PRN, Juma Thoams MD    sodium chloride (PF) 0.9% PF flush 3 mL, 3 mL, Intracatheter, Q8H, Juma Thomas MD, 3 mL at 08/15/24 1428    sodium chloride (PF) 0.9% PF flush 3 mL, 3 mL, Intracatheter, q1 min prn, Juma Thomas MD    sodium chloride 0.9 % infusion, , Intravenous, Continuous, Han Resendiz MD    No Known Allergies    Social History     Socioeconomic History    Marital status:      Spouse name: None    Number of children: None    Years of  education: None    Highest education level: None   Tobacco Use    Smoking status: Never    Smokeless tobacco: Never   Substance and Sexual Activity    Alcohol use: Never    Drug use: Never     Social Determinants of Health      Received from [place]    Social Connections       History reviewed. No pertinent family history.    Review of Systems: ladder pain. Flan pain resolved. No N.V, F/C, dizziness. The remainder of 10 point review of systems is negative except as noted in HPI above.     /58 (BP Location: Right arm)   Pulse 63   Temp 98.6  F (37  C) (Oral)   Resp 18   Wt 52.2 kg (115 lb)   SpO2 95%     Intake/Output Summary (Last 24 hours) at 8/15/2024 1457  Last data filed at 8/15/2024 1409  Gross per 24 hour   Intake 1460 ml   Output 1950 ml   Net -490 ml     Physical Exam:   GENERAL: calm and comfortable, alert  EYES: No scleral icterus, conjunctiva clear  ENT: Hearing normal, Oral mucosa moist  RESP: Clear to auscultation bilaterally with no respiratory distress, normal effort.  CV: RRR, no murmurs. No leg edema.    GI: Active BS, Soft, NT/ND, no masses or HSM  : No CVA tenderness   Musculoskeletal: Normal muscle bulk/ tone; No gross joint abnormalities  SKIN: No rash, warm/ dry  PSYCH:  Appropriate mood and affect  Lymph: No cervical/ inguinal adenopathy    LABS:  Recent Labs   Lab 08/15/24  0646 08/14/24  1342   * 136   POTASSIUM 4.9 4.6   CHLORIDE 96* 100   CO2 21* 24   BUN 42.2* 46.1*   CR 4.04* 4.48*   GFRESTIMATED 12* 10*   RAFFAELE 8.5* 9.0         Recent Labs   Lab 08/14/24  1342   WBC 8.5   HGB 12.2   HCT 36.6   MCV 87          EXAM: CT ABDOMEN PELVIS STONE PROTOCOL WO   LOCATION: [facility]   DATE: 8/14/2024     INDICATION: Flank pain, kidney stone suspected   COMPARISON: None.   TECHNIQUE: CT scan of the abdomen and pelvis was performed without oral or IV contrast. Multiplanar reformats were obtained. Dose reduction techniques  were used.   CONTRAST: None.     FINDINGS:   LOWER CHEST: Normal.     HEPATOBILIARY: Normal.     PANCREAS: Normal.     SPLEEN: Normal.     ADRENAL GLANDS: Normal.     KIDNEY/BLADDER: Right hydronephrosis extending to the ureteropelvic junction, where there is a 20 mm x 7 mm x 9 mm calculus. There is a 15 mm x 15 mm x 18 mm calculus at the upper pole of the right kidney. There is low-attenuation in the upper pole of the right kidney. The left kidney is mildly atrophic. There is a 28 mm x 12 mm x 15 mm left renal pelvis calculus. No definite hydronephrosis or hydroureter.     BOWEL: Normal.     LYMPH NODES: Normal.     VASCULATURE: Normal.     PELVIC ORGANS: There is a 1.7 cm low-attenuation left ovarian lesion.     OTHER: There is a small amount of free fluid in the pelvis.     MUSCULOSKELETAL: Normal.     IMPRESSION:   1. Right hydronephrosis extending to the ureteropelvic junction, where there is a 20 mm x 7 mm x 9 mm calculus.   2.  Large bilateral renal calculi. Low-attenuation at the upper pole of the right kidney could be seen with focal obstruction.   3.  A 1.7 cm low-attenuation left ovarian lesion is likely a benign cyst. No follow-up is required.   4.  A small amount of free fluid in the pelvis is nonspecific. This is abnormal.     EXAM: US RENAL AND BLADDER COMPLETE   LOCATION: [facility]   DATE: 7/13/2023     INDICATION: Nephrolithiasis   COMPARISON: None.   TECHNIQUE: Routine Bilateral Renal and Bladder Ultrasound.     FINDINGS:     RIGHT KIDNEY: 9.9 x 4.4 x 4.7 cm. Normal without hydronephrosis or masses. The renal cortex measures 1.8 cm. Several small cysts are identified on the right. No further evaluation of these is necessary.     LEFT KIDNEY: 9.4 x 4.0 x 3.9 cm. Mild to moderate hydronephrosis. No definite renal calculi are identified. No renal masses.     BLADDER: Normal. Both ureteral jets are present     IMPRESSION:   1. Left-sided hydronephrosis, mild to moderate     All lab data was  reviewed at 2:57 PM

## 2024-08-15 NOTE — PLAN OF CARE
PRN tylenol given x1 for mild abdominal pain. Pt reports this was effective.  Pt up independently in room.  Urine is red. No stone seen after straining urine.  Pt eating food from home. Daughter present this afternoon and updated.

## 2024-08-16 DIAGNOSIS — N20.0 KIDNEY STONE: Primary | ICD-10-CM

## 2024-08-16 LAB
ANION GAP SERPL CALCULATED.3IONS-SCNC: 11 MMOL/L (ref 7–15)
BACTERIA UR CULT: NO GROWTH
BUN SERPL-MCNC: 33.9 MG/DL (ref 8–23)
CALCIUM SERPL-MCNC: 8.1 MG/DL (ref 8.8–10.4)
CHLORIDE SERPL-SCNC: 99 MMOL/L (ref 98–107)
CREAT SERPL-MCNC: 3.1 MG/DL (ref 0.51–0.95)
EGFRCR SERPLBLD CKD-EPI 2021: 16 ML/MIN/1.73M2
GLUCOSE SERPL-MCNC: 93 MG/DL (ref 70–99)
HCO3 SERPL-SCNC: 22 MMOL/L (ref 22–29)
POTASSIUM SERPL-SCNC: 4.8 MMOL/L (ref 3.4–5.3)
PTH-INTACT SERPL-MCNC: 58 PG/ML (ref 15–65)
SODIUM SERPL-SCNC: 132 MMOL/L (ref 135–145)

## 2024-08-16 PROCEDURE — 250N000013 HC RX MED GY IP 250 OP 250 PS 637: Performed by: HOSPITALIST

## 2024-08-16 PROCEDURE — 120N000001 HC R&B MED SURG/OB

## 2024-08-16 PROCEDURE — 82374 ASSAY BLOOD CARBON DIOXIDE: CPT | Performed by: INTERNAL MEDICINE

## 2024-08-16 PROCEDURE — 258N000003 HC RX IP 258 OP 636: Performed by: INTERNAL MEDICINE

## 2024-08-16 PROCEDURE — 250N000013 HC RX MED GY IP 250 OP 250 PS 637: Performed by: STUDENT IN AN ORGANIZED HEALTH CARE EDUCATION/TRAINING PROGRAM

## 2024-08-16 PROCEDURE — 36415 COLL VENOUS BLD VENIPUNCTURE: CPT | Performed by: INTERNAL MEDICINE

## 2024-08-16 PROCEDURE — 99232 SBSQ HOSP IP/OBS MODERATE 35: CPT | Performed by: INTERNAL MEDICINE

## 2024-08-16 RX ORDER — LOPERAMIDE HCL 2 MG
2 CAPSULE ORAL 4 TIMES DAILY PRN
Status: DISCONTINUED | OUTPATIENT
Start: 2024-08-16 | End: 2024-08-17 | Stop reason: HOSPADM

## 2024-08-16 RX ORDER — PHENAZOPYRIDINE HYDROCHLORIDE 100 MG/1
100 TABLET, FILM COATED ORAL 3 TIMES DAILY PRN
Status: DISCONTINUED | OUTPATIENT
Start: 2024-08-16 | End: 2024-08-17 | Stop reason: HOSPADM

## 2024-08-16 RX ADMIN — SODIUM CHLORIDE: 9 INJECTION, SOLUTION INTRAVENOUS at 04:58

## 2024-08-16 RX ADMIN — PHENAZOPYRIDINE 100 MG: 100 TABLET ORAL at 00:34

## 2024-08-16 RX ADMIN — SODIUM CHLORIDE: 9 INJECTION, SOLUTION INTRAVENOUS at 13:31

## 2024-08-16 RX ADMIN — ACETAMINOPHEN 650 MG: 325 TABLET ORAL at 00:34

## 2024-08-16 RX ADMIN — LOPERAMIDE HYDROCHLORIDE 2 MG: 2 CAPSULE ORAL at 00:34

## 2024-08-16 RX ADMIN — Medication 5 MG: at 00:34

## 2024-08-16 RX ADMIN — ACETAMINOPHEN 650 MG: 325 TABLET ORAL at 09:04

## 2024-08-16 ASSESSMENT — ACTIVITIES OF DAILY LIVING (ADL)
ADLS_ACUITY_SCORE: 20

## 2024-08-16 NOTE — PLAN OF CARE
Problem: Adult Inpatient Plan of Care  Goal: Plan of Care Review  Description: The Plan of Care Review/Shift note should be completed every shift.  The Outcome Evaluation is a brief statement about your assessment that the patient is improving, declining, or no change.  This information will be displayed automatically on your shift  note.  Outcome: Progressing   Goal Outcome Evaluation:       Pt is alert and oriented x 4, Hmong speaking.  needed to complete assessments. Standby assist with an IV pool and independent without. Pt voided pink/red urine which was strained for kidney stones which was negative.

## 2024-08-16 NOTE — PROGRESS NOTES
.[facility]    Medicine Progress Note - Hospitalist Service    Date of Admission:  8/14/2024    Assessment & Plan   Zoraida Marie is a 64 year old female with a medical history of nephrolithiasis who is admitted on 8/14/2024 with right hydronephrosis secondary to large obstructing stone.  Case is complicated by ISIAH.    8/14 :   S/p   1. Cystourethroscopy with bilateral  retrograde pyelography  2. Placement of bilateral ureteral stent.  3. Intraoperative interpretation of fluoroscopic imaging.       A/p :      Right hydronephrosis  Obstructing UPJ stone  -Consulted urology: stents placement done  -stone work-up per urology   -Pain control with Tylenol  -Withhold antibiotics unless fevers or other signs and symptoms of infection develop  -urine cx from OR : no growth  -Stable post procedure. Urology signed off    ISIAH  -secondary to obstructive uropathy, anticipate resolution s/p stent placement  Creatinine slowly trending down - post procedure- seems  acute  Continue iv fluids  Consult nephrology    Hyponatremia  -iv ns     Chronic conditions: patient denies chronic conditions, does not take any scheduled meds at home          Diet: Renal Diet (non-dialysis)    DVT Prophylaxis: Pneumatic Compression Devices  Ackerman Catheter: Not present  Lines: None     Cardiac Monitoring: None  Code Status: Full Code      Clinically Significant Risk Factors                                         Disposition Plan     Medically Ready for Discharge: Anticipated Tomorrow    Discharge once renal functions stable and  ok  with nephrology         Angela Headley MD  Hospitalist Service  [facility]  Securely message with [employer] (more info)  Text page via [employer] Paging/Directory   ______________________________________________________________________    Interval History   No new complaints, no f/c, no cp/sob, no n/v    Physical Exam   Vital Signs: Temp: 98.3  F (36.8  C) Temp src: Oral BP: 99/57  Pulse: 61   Resp: 18 SpO2: 96 % O2 Device: None (Room air)    Weight: 115 lbs 0 oz    General.  Awake alert oriented not in acute distress.  HEENT.  Pupils equal round react to light, anicteric, EOM intact.  Neck supple no JVD.  Abdomen.  Soft nontender bowel sounds present.  Extremities.  No edema no calf tenderness.  Neurological.  No focal deficit.  Skin No pallor.  Psych. Normal mood.      Medical Decision Making       46 MINUTES SPENT BY ME on the date of service doing chart review, history, exam, documentation & further activities per the note.      Data     I have personally reviewed the following data over the past 24 hrs:    N/A  \   N/A   / N/A     132 (L) 99 33.9 (H) /  93   4.8 22 3.10 (H) \       Imaging results reviewed over the past 24 hrs:   No results found for this or any previous visit (from the past 24 hour(s)).

## 2024-08-16 NOTE — PLAN OF CARE
Goal Outcome Evaluation:      Plan of Care Reviewed With: patient    Overall Patient Progress: improvingOverall Patient Progress: improving    Outcome Evaluation: Pt complains of abd pain 5/10 and dysuria. Requested Tylenol for pain, states that brought pain to a tolerable level. Strained urine, no stone noted, small clots noted, urine is red. Offered pyridium. Will ask provider. Next shift notified 600 ml out.

## 2024-08-16 NOTE — PROGRESS NOTES
'    RENAL ([facility]) progress note  CC: F/U ISIAH/ obstructive uropathy, bilateral stones  S: Since last visit, patient feels better but still dysuria. No further right flank pain. Denies N/V. Care d/w daughter by phone.    A/P:   ISIAH on CKD-3b. Cr 1.7 mg/dl in 8/2023 with extensive h/o kidney stones in [place] prior to 2023. Has been told of kidney weakness prior to immigrating here in 2023. Huge bilateral kidney stones with relative atrophy of left kidney on CT scan causative for CKD and new right sided obstruction causing ISIAH.              - Poor fluid intake noted - increase to 100 oz daily              - Stop IVF and push fluids overnight  OK to discharge in AM as long as creatinine continues to improve.              - Check PTH to eval for CKD assoc mineral and bone disorder.              - Dr. Eid ordered outpt F/U at [facility] (They live in [place]). Labs including BMP and 24 hour UroRisk profile ordered.      Bilateral nephrolithiasis with right hydronephrosis. S/P bilat stent placement 8/13/24 with outpt stone management per Urology.   - ~2 cm stones in each kidney with right sided hydro, left kidney with relative atrophy              - Mild stent pain at present but flank pain resolved.      Hyponatremia: Acute since admit. Resume NS 75 ml/hr given poor fluid intake overall.      Hyperuricemia (Uric acid 9.0): Given recurrent stones, will start Allopurinol as outpt pending further recovery of ISIAH.     Raudel Eid MD  [facility]  [phone] (off)       No interval changes to past medical history, social history or family history to report.    BP 99/57 (BP Location: Right arm)   Pulse 61   Temp 98.3  F (36.8  C) (Oral)   Resp 18   Wt 52.2 kg (115 lb)   SpO2 96%     I/O last 3 completed shifts:  In: 480 [P.O.:480]  Out: 1000 [Urine:1000]    Physical Exam:   GENERAL: calm and comfortable, alert  EYES: pupils equal, sclerae not icteric.  ENT: Hearing normal, oral mucosa  moist.  RESP: Clear to auscultation bilaterally with no respiratory distress, normal effort.  CV: RRR, no murmurs. No leg edema.    GI: Active BS, Soft, NT/ND, no masses or HSM  : No CVA tenderness   SKIN: No rash, warm/ dry  NEURO: Moves all extremities, no tremor. Sensation grossly intact to LT  PSYCH: Appropriate mood and affect    Recent Labs   Lab 08/16/24  0650 08/15/24  0646 08/14/24  1342   * 130* 136   POTASSIUM 4.8 4.9 4.6   CHLORIDE 99 96* 100   CO2 22 21* 24   BUN 33.9* 42.2* 46.1*   CR 3.10* 4.04* 4.48*   GFRESTIMATED 16* 12* 10*   RAFFAELE 8.1* 8.5* 9.0       Recent Labs   Lab 08/14/24  1342   WBC 8.5   HGB 12.2   HCT 36.6   MCV 87        EXAM: CT ABDOMEN PELVIS STONE PROTOCOL WO   LOCATION: [facility]   DATE: 8/14/2024     INDICATION: Flank pain, kidney stone suspected   COMPARISON: None.   TECHNIQUE: CT scan of the abdomen and pelvis was performed without oral or IV contrast. Multiplanar reformats were obtained. Dose reduction techniques were used.   CONTRAST: None.     FINDINGS:   LOWER CHEST: Normal.     HEPATOBILIARY: Normal.     PANCREAS: Normal.     SPLEEN: Normal.     ADRENAL GLANDS: Normal.     KIDNEY/BLADDER: Right hydronephrosis extending to the ureteropelvic junction, where there is a 20 mm x 7 mm x 9 mm calculus. There is a 15 mm x 15 mm x 18 mm calculus at the upper pole of the right kidney. There is low-attenuation in the upper pole of the right kidney. The left kidney is mildly atrophic. There is a 28 mm x 12 mm x 15 mm left renal pelvis calculus. No definite hydronephrosis or hydroureter.     BOWEL: Normal.     LYMPH NODES: Normal.     VASCULATURE: Normal.     PELVIC ORGANS: There is a 1.7 cm low-attenuation left ovarian lesion.     OTHER: There is a small amount of free fluid in the pelvis.     MUSCULOSKELETAL: Normal.     IMPRESSION:   1. Right hydronephrosis extending to the ureteropelvic junction, where there is a 20 mm x 7 mm x 9 mm calculus.   2.  Large  bilateral renal calculi. Low-attenuation at the upper pole of the right kidney could be seen with focal obstruction.   3.  A 1.7 cm low-attenuation left ovarian lesion is likely a benign cyst. No follow-up is required.   4.  A small amount of free fluid in the pelvis is nonspecific. This is abnormal.         Current Facility-Administered Medications:     acetaminophen (TYLENOL) tablet 650 mg, 650 mg, Oral, Q4H PRN, 650 mg at 08/16/24 0904 **OR** acetaminophen (TYLENOL) Suppository 650 mg, 650 mg, Rectal, Q4H PRN, Juma Thomas MD    calcium carbonate (TUMS) chewable tablet 1,000 mg, 1,000 mg, Oral, 4x Daily PRN, Juma Thomas MD    hydrALAZINE (APRESOLINE) tablet 10 mg, 10 mg, Oral, Q4H PRN **OR** hydrALAZINE (APRESOLINE) injection 10 mg, 10 mg, Intravenous, Q4H PRN, Juma Thomas MD    lidocaine (LMX4) cream, , Topical, Q1H PRN, Juma Thomas MD    lidocaine 1 % 0.1-1 mL, 0.1-1 mL, Other, Q1H PRN, Juma Thomas MD    loperamide (IMODIUM) capsule 2 mg, 2 mg, Oral, 4x Daily PRN, Danyel Riley MD, 2 mg at 08/16/24 0034    melatonin tablet 5 mg, 5 mg, Oral, At Bedtime PRN, Juma Thomas MD, 5 mg at 08/16/24 0034    ondansetron (ZOFRAN ODT) ODT tab 4 mg, 4 mg, Oral, Q6H PRN **OR** ondansetron (ZOFRAN) injection 4 mg, 4 mg, Intravenous, Q6H PRN, Juma Thomas MD    phenazopyridine (PYRIDIUM) tablet 100 mg, 100 mg, Oral, TID PRN, Danyel Riley MD, 100 mg at 08/16/24 0034    polyethylene glycol (MIRALAX) Packet 17 g, 17 g, Oral, BID PRN, Juma Thomas MD    senna-docusate (SENOKOT-S/PERICOLACE) 8.6-50 MG per tablet 1 tablet, 1 tablet, Oral, BID PRN **OR** senna-docusate (SENOKOT-S/PERICOLACE) 8.6-50 MG per tablet 2 tablet, 2 tablet, Oral, BID PRN, Juma Thomas MD    sodium chloride (PF) 0.9% PF flush 3 mL, 3 mL, Intracatheter, Q8H, WilliamJuma saba MD, 3 mL at 08/15/24 1428    sodium chloride (PF) 0.9% PF flush 3 mL, 3 mL, Intracatheter, q1 min  prn, William, Juma Isidro MD    sodium chloride 0.9 % infusion, , Intravenous, Continuous, Han Resendiz MD, Last Rate: 100 mL/hr at 08/16/24 1331, New Bag at 08/16/24 1331      Labs personally reviewed today during this evaluation at 2:11 PM

## 2024-08-16 NOTE — PLAN OF CARE
Problem: Adult Inpatient Plan of Care  Goal: Plan of Care Review  Outcome: Progressing  Flowsheets (Taken 8/16/2024 0603)  Plan of Care Reviewed With: patient  Overall Patient Progress: improving     Problem: Adult Inpatient Plan of Care  Goal: Optimal Comfort and Wellbeing  Intervention: Monitor Pain and Promote Comfort  Recent Flowsheet Documentation  Taken 8/16/2024 0034 by Spencer Dumont RN  Pain Management Interventions: medication (see MAR)     Problem: Extracorporeal Shock Wave Lithotripsy  Goal: Effective Urinary Elimination  Intervention: Optimize Urine Output  Recent Flowsheet Documentation  Taken 8/16/2024 0602 by Spencer Dumont RN  Urinary Elimination Promotion: frequent voiding encouraged  Urine Straining: urine strained, no sediment/stone noted     Goal Outcome Evaluation:      Plan of Care Reviewed With: patient    Overall Patient Progress: improvingOverall Patient Progress: improving    A&Ox4. VSS. Reports pain to abdomen, and having loose stool x2, PRN imodium ordered and given. PRN tylenol for pain 5/10, effective. Warm water offered. PRN melatonin given for sleep. PRN pyridium ordered for dysuria. Slept rest of shift, denies further needs. PIV LAC patent, infusing NS @ 100 ml/hr. Urine OP red/pink, no stone passed reported.     Spencer Dumont, RN

## 2024-08-16 NOTE — PLAN OF CARE
Problem: Adult Inpatient Plan of Care  Goal: Plan of Care Review  Outcome: Progressing  Goal: Optimal Comfort and Wellbeing  Outcome: Progressing  Intervention: Monitor Pain and Promote Comfort  Recent Flowsheet Documentation  Taken 8/16/2024 1100 by Nay Rodas RN  Pain Management Interventions: medication (see MAR)     Problem: Extracorporeal Shock Wave Lithotripsy  Goal: Effective Urinary Elimination  Outcome: Progressing  Intervention: Optimize Urine Output  Recent Flowsheet Documentation  Taken 8/16/2024 1100 by Nay Rodas RN  Urinary Elimination Promotion: frequent voiding encouraged  Urine Straining: urine strained, no sediment/stone noted      services utilized.   Independent in room   Left IV   Saline lock per order per MAR   Renal diet. Food provided from home   Give tylenol x 1 5/10 pain- pain increases with urination   Urine output pink red- Urine drain per order    Family at bedside   Nay Rodas RN on 8/16/2024 at 4:16 PM

## 2024-08-17 VITALS
SYSTOLIC BLOOD PRESSURE: 107 MMHG | RESPIRATION RATE: 18 BRPM | WEIGHT: 115 LBS | HEART RATE: 72 BPM | OXYGEN SATURATION: 97 % | TEMPERATURE: 98.3 F | DIASTOLIC BLOOD PRESSURE: 59 MMHG

## 2024-08-17 LAB
ANION GAP SERPL CALCULATED.3IONS-SCNC: 13 MMOL/L (ref 7–15)
BUN SERPL-MCNC: 29.5 MG/DL (ref 8–23)
CALCIUM SERPL-MCNC: 8.7 MG/DL (ref 8.8–10.4)
CHLORIDE SERPL-SCNC: 100 MMOL/L (ref 98–107)
CREAT SERPL-MCNC: 2.42 MG/DL (ref 0.51–0.95)
EGFRCR SERPLBLD CKD-EPI 2021: 22 ML/MIN/1.73M2
GLUCOSE SERPL-MCNC: 85 MG/DL (ref 70–99)
HCO3 SERPL-SCNC: 22 MMOL/L (ref 22–29)
HOLD SPECIMEN: NORMAL
POTASSIUM SERPL-SCNC: 5 MMOL/L (ref 3.4–5.3)
SODIUM SERPL-SCNC: 135 MMOL/L (ref 135–145)

## 2024-08-17 PROCEDURE — 36415 COLL VENOUS BLD VENIPUNCTURE: CPT | Performed by: INTERNAL MEDICINE

## 2024-08-17 PROCEDURE — 80048 BASIC METABOLIC PNL TOTAL CA: CPT | Performed by: INTERNAL MEDICINE

## 2024-08-17 PROCEDURE — 99239 HOSP IP/OBS DSCHRG MGMT >30: CPT | Performed by: INTERNAL MEDICINE

## 2024-08-17 RX ORDER — ACETAMINOPHEN 325 MG/1
650 TABLET ORAL EVERY 4 HOURS PRN
COMMUNITY
Start: 2024-08-17

## 2024-08-17 ASSESSMENT — ACTIVITIES OF DAILY LIVING (ADL)
ADLS_ACUITY_SCORE: 20

## 2024-08-17 NOTE — PROGRESS NOTES
UROLOGY PROGRESS NOTE    I personally met with Ms. Marie on the floor today  She is showing signs of clinical improvement.  Cr downtrending.  Pain controlled.  Reviewed management options, discussed that stents are temporizing measure only.  Reviewed available management options for stones.  Shared decision made to proceed with bilateral PCNL at later date.  Will try to coordinate as outpatient.  Risks, benefits, alterantives reviewed.      Significant for:    CBC RESULTS:  Recent Labs   Lab Test 08/14/24  1342   WBC 8.5   HGB 12.2           BMP RESULTS:  Recent Labs   Lab Test 08/16/24  0650 08/15/24  0646 08/14/24  1701 08/14/24  1342   * 130*  --  136   POTASSIUM 4.8 4.9  --  4.6   CHLORIDE 99 96*  --  100   CO2 22 21*  --  24   ANIONGAP 11 13  --  12   GLC 93 94 100* 103*   BUN 33.9* 42.2*  --  46.1*   CR 3.10* 4.04*  --  4.48*   GFRESTIMATED 16* 12*  --  10*       CALCIUM RESULTS:  Recent Labs   Lab Test 08/16/24  0650 08/15/24  0646 08/14/24  1342   RAFFAELE 8.1* 8.5* 9.0       PTH RESULTS:  Recent Labs   Lab Test 08/15/24  1912   PTHI 58       UA RESULTS:   Recent Labs   Lab Test 08/14/24  1237   SG 1.006   URINEPH 6.0   NITRITE Negative   RBCU <1   WBCU 5

## 2024-08-17 NOTE — PLAN OF CARE
Problem: Adult Inpatient Plan of Care  Goal: Plan of Care Review  Description: The Plan of Care Review/Shift note should be completed every shift.  The Outcome Evaluation is a brief statement about your assessment that the patient is improving, declining, or no change.  This information will be displayed automatically on your shift  note.  Outcome: Progressing   Goal Outcome Evaluation:       Pt is alert and oriented x4. Room air. Denies pain this shift. Voided, pinkish output, no stones. PIV saline locked. Renal diet. Family member at the bedside. VSS.

## 2024-08-17 NOTE — PLAN OF CARE
Patient discharged via wheelchair to home with family at 12:50 PM.     AVS printed and medications updated. AVS given to patient. All discharge medications, restrictions, and instructions reviewed with patient and family. Patient's family verbalized understanding of discharge medications, restrictions, and instructions. Verified patient's primary and secondary contact phone numbers on account for follow-ups.     Patient to follow-up with ordered lab appointment 8/19/24, PCP within 7 days, urology to call with follow-up appointments, and [facility] to call with follow-up appointments.  Patient instructed follow-up with PCP/specialist providers or to seek care if experiencing worsening symptoms, such as inability to void, uncontrolled pain, fevers, and uncontrolled nausea or vomiting.      PIV removed. Education completed. Belongings returned to patient.    ------    Problem: Adult Inpatient Plan of Care  Goal: Plan of Care Review  Outcome: Met

## 2024-08-17 NOTE — DISCHARGE SUMMARY
[facility]    Discharge Summary  Hospitalist    Date of Admission:  8/14/2024  Date of Discharge:  8/17/2024  Discharging Provider: Angela Headley MD  Date of Service (when I saw the patient): 08/17/24    Discharge Diagnoses   Right hydronephrosis  Obstructing UPJ stone  ISIAH  Hyponatremia    History of Present Illness   Zoraida Marie is an 64 year old female who presented with flank pain.    Hospital Course   Zoraida Marie is a 64 year old female with a medical history of nephrolithiasis who is admitted on 8/14/2024 with right hydronephrosis secondary to large obstructing stone.  Case is complicated by ISIAH.      Right hydronephrosis  Obstructing UPJ stone  -Consulted urology: stents placement done  -stone work-up per urology   -Pain control with Tylenol  -Withhold antibiotics unless fevers or other signs and symptoms of infection develop  -urine cx from OR : no growth. No need for antibiotics  -Stable post procedure. Urology signed off    8/14 :   S/p   1. Cystourethroscopy with bilateral  retrograde pyelography  2. Placement of bilateral ureteral stent.  3. Intraoperative interpretation of fluoroscopic imaging.      ISIAH  -secondary to obstructive uropathy, anticipate resolution s/p stent placement  Creatinine slowly trending down   Consult nephrology: ok to discharge     Hyponatremia  -iv ns. Improving.     Chronic conditions: patient denies chronic conditions, does not take any scheduled meds at home     Angela Headley MD    Significant Results and Procedures   See below    Pending Results   These results will be followed up by pcp, nephrologist  Unresulted Labs Ordered in the Past 30 Days of this Admission       Date and Time Order Name Status Description    8/17/2024 12:00 AM Basic metabolic panel In process             Code Status   Full Code       Primary Care Physician   Shilpa Roseg    .  Awake alert oriented not in acute distress.  HEENT.  Pupils equal round react to light,  anicteric, EOM intact.  Neck supple no JVD.  CVS regular rhythm no murmur gallops.  Lungs.  Clear to auscultation bilateral no wheezing or rales.  Abdomen.  Soft nontender bowel sounds present.  Extremities.  No edema no calf tenderness.  Neurological.  Awake and alert. No focal deficit.  Skin no rash. No pallor.  Psych. Normal mood.      Discharge Disposition   Discharged to home  Condition at discharge: Stable    Consultations This Hospital Stay   UROLOGY IP CONSULT  NEPHROLOGY IP CONSULT    Time Spent on this Encounter   I, Angela Headley MD, personally saw the patient today and spent greater than 30 minutes discharging this patient.    Discharge Orders      Basic metabolic panel  (Ca, Cl, CO2, Creat, Gluc, K, Na, BUN)     Reason for your hospital stay    Bilateral hydronephrosis s/p stents placement  ISIAH  Hyponatremia     Follow-up and recommended labs and tests     Follow up with primary care provider, Shilpa Ford, within 7 days to evaluate treatment change, to evaluate after surgery, for hospital follow- up, and regarding new diagnosis.  The following labs/tests are recommended: cbc, bmp, magnesium.    Follow up with Urologist for further management for hydronephrosis (stents are not final solution);  Follow up with nephrologist as instructed     Activity    Your activity upon discharge: activity as tolerated     When to contact your care team    Call your primary doctor if you have any of the following:  increased shortness of breath, increased swelling, increased pain, or fever or any concerns.     Discharge Instructions    Recheck renal function on next Monday and then through PCP within 1 week     Diet    Follow this diet upon discharge: Orders Placed This Encounter      Renal Diet (non-dialysis)     Discharge Medications   Current Discharge Medication List        START taking these medications    Details   acetaminophen (TYLENOL) 325 MG tablet Take 2 tablets (650 mg) by mouth every 4 hours as needed for  mild pain or other (and adjunct with moderate or severe pain or per patient request)    Associated Diagnoses: Ureteral stone           Allergies   No Known Allergies  Data   Most Recent 3 CBC's:  Recent Labs   Lab Test 08/14/24  1342   WBC 8.5   HGB 12.2   MCV 87         Most Recent 3 BMP's:  Recent Labs   Lab Test 08/17/24  0629 08/16/24  0650 08/15/24  0646    132* 130*   POTASSIUM 5.0 4.8 4.9   CHLORIDE 100 99 96*   CO2 22 22 21*   BUN 29.5* 33.9* 42.2*   CR 2.42* 3.10* 4.04*   ANIONGAP 13 11 13   RAFFAELE 8.7* 8.1* 8.5*   GLC 85 93 94     Most Recent 2 LFT's:No lab results found.  Most Recent INR's and Anticoagulation Dosing History:  Anticoagulation Dose History           No data to display              Most Recent 3 Troponin's:No lab results found.  Most Recent Cholesterol Panel:No lab results found.  Most Recent 6 Bacteria Isolates From Any Culture (See EPIC Reports for Culture Details):No lab results found.  Most Recent TSH, T4 and A1c Labs:No lab results found.  Results for orders placed or performed during the hospital encounter of 08/14/24   XR Surgery CAROLINA Fluoro L/T 5 Min    Narrative    This exam was marked as non-reportable because it will not be read by a   radiologist or a [place] non-radiologist provider.

## 2024-08-30 ENCOUNTER — TELEPHONE (OUTPATIENT)
Dept: UROLOGY | Facility: CLINIC | Age: 64
End: 2024-08-30
Payer: COMMERCIAL

## 2024-08-30 NOTE — TELEPHONE ENCOUNTER
Spoke to patient's daughter Calixto who is the main  on file regarding PCNL surgery scheduling.      Surgery Date:  9/12/24  Time:  2:10 p.m.  Arrival:  12:20 p.m.  Hospital:  [facility]    Daughter was informed that the time may change per OR schedule and they will receive a final call to confirm close to the surgery date.  Calixto was also informed to call patient's pcp for a pre-op physical appointment.  PCNL surgery education was reviewed with understanding.  She is also going to meet with her siblings to go over surgery plan and will call with any questions or concerns.  Surgery packet will be mailed to patient.  I will contact patient next week to check on pre-op physical appointment and add a UC lab per routine protocol.

## 2024-08-30 NOTE — TELEPHONE ENCOUNTER
Spoke with: Barbara BOURNE MA who speaks hmong talked with patient       Date of surgery: Thursday Sept 12 2024 with Dr Ohara      Location: [place]       Informed patient they will need a adult : 23 hr obs      Pre op with provider: Patients daughter will schedule with PCP      H&P Scheduled in PAC- NA        Pre procedure covid :Not req      Additional imaging: NA        Surgery Packet :Mailed to patient      Additional comments: Please call for surgery teaching

## 2024-09-04 ENCOUNTER — LAB (OUTPATIENT)
Dept: LAB | Facility: CLINIC | Age: 64
End: 2024-09-04
Payer: COMMERCIAL

## 2024-09-04 DIAGNOSIS — N20.1 URETERAL STONE: ICD-10-CM

## 2024-09-04 DIAGNOSIS — N20.1 URETERAL STONE: Primary | ICD-10-CM

## 2024-09-04 PROCEDURE — 87086 URINE CULTURE/COLONY COUNT: CPT

## 2024-09-06 LAB
BACTERIA UR CULT: ABNORMAL

## 2024-09-09 ENCOUNTER — TELEPHONE (OUTPATIENT)
Dept: UROLOGY | Facility: CLINIC | Age: 64
End: 2024-09-09
Payer: COMMERCIAL

## 2024-09-09 ENCOUNTER — APPOINTMENT (OUTPATIENT)
Dept: INTERPRETER SERVICES | Facility: CLINIC | Age: 64
End: 2024-09-09
Payer: COMMERCIAL

## 2024-09-09 DIAGNOSIS — N20.1 URETERAL STONE: Primary | ICD-10-CM

## 2024-09-09 RX ORDER — CEPHALEXIN 500 MG/1
500 CAPSULE ORAL 2 TIMES DAILY
Qty: 6 CAPSULE | Refills: 0 | Status: SHIPPED | OUTPATIENT
Start: 2024-09-09 | End: 2024-10-02

## 2024-09-09 NOTE — TELEPHONE ENCOUNTER
Spoke with patient's daughter using [employer] .    Procedure: NEPHROLITHOTOMY, PERCUTANEOUS, USING HOLMIUM LASER     Date: 09/12  Provider: Mayda  Time: [address] [place], arrive 2 hours prior     Pre op appt: 09/03  UA/UC: Done 09/04, sent kelfex 500mg BID x 3 days. Pt's daughter will  and start 09/09    Reviewed medications (anticoagulants, diabetes medications etc): reviewed pre-op notes  Soap: reviewed  Reviewed when to start clear liquids and when to start NPO: yes  Confirmed pt has  and 24 hour observation: yes    Pt or family member expressed understanding: yes    Vicky Voss RN  9/9/2024  12:19 PM

## 2024-09-10 ENCOUNTER — APPOINTMENT (OUTPATIENT)
Dept: INTERPRETER SERVICES | Facility: CLINIC | Age: 64
End: 2024-09-10
Payer: COMMERCIAL

## 2024-09-10 RX ORDER — TRIAMCINOLONE ACETONIDE 1 MG/G
CREAM TOPICAL 3 TIMES DAILY
COMMUNITY
Start: 2024-08-27 | End: 2024-10-02

## 2024-09-10 RX ORDER — ALLOPURINOL 100 MG/1
TABLET ORAL
COMMUNITY
Start: 2024-08-27 | End: 2024-10-02

## 2024-09-11 ENCOUNTER — ANESTHESIA EVENT (OUTPATIENT)
Dept: SURGERY | Facility: CLINIC | Age: 64
End: 2024-09-11
Payer: COMMERCIAL

## 2024-09-11 ENCOUNTER — VIRTUAL VISIT (OUTPATIENT)
Dept: INTERPRETER SERVICES | Facility: CLINIC | Age: 64
End: 2024-09-11
Payer: COMMERCIAL

## 2024-09-11 PROCEDURE — T1013 SIGN LANG/ORAL INTERPRETER: HCPCS | Mod: U4,TEL,95 | Performed by: INTERPRETER

## 2024-09-11 NOTE — ANESTHESIA PREPROCEDURE EVALUATION
Anesthesia Pre-Procedure Evaluation    Patient: Zoraida Marie   MRN: [id number] : 1960        Procedure : Procedure(s):  NEPHROLITHOTOMY, PERCUTANEOUS, USING HOLMIUM LASER          History reviewed. No pertinent past medical history.   Past Surgical History:   Procedure Laterality Date    CYSTOSCOPY, RETROGRADES, INSERT STENT URETER(S), COMBINED Bilateral 2024    Procedure: CYSTOSCOPY, WITH bilateral RETROGRADE PYELOGRAM AND BILATERAL URETERAL STENT INSERTION;  Surgeon: Juma Thomas MD;  Location: [facility] [place]      No Known Allergies   Social History     Tobacco Use    Smoking status: Never    Smokeless tobacco: Never   Substance Use Topics    Alcohol use: Never      Wt Readings from Last 1 Encounters:   24 52.2 kg (115 lb)        Anesthesia Evaluation   Pt has had prior anesthetic.         ROS/MED HX  ENT/Pulmonary:       Neurologic:  - neg neurologic ROS     Cardiovascular:  - neg cardiovascular ROS     METS/Exercise Tolerance: >4 METS    Hematologic:  - neg hematologic  ROS     Musculoskeletal:  - neg musculoskeletal ROS     GI/Hepatic:  - neg GI/hepatic ROS     Renal/Genitourinary:     (+) renal disease, type: CRI,     Nephrolithiasis ,       Endo:  - neg endo ROS     Psychiatric/Substance Use:  - neg psychiatric ROS     Infectious Disease:  - neg infectious disease ROS     Malignancy:  - neg malignancy ROS     Other:  - neg other ROS          Physical Exam    Airway        Mallampati: II   TM distance: > 3 FB   Neck ROM: full   Mouth opening: > 3 cm    Respiratory Devices and Support         Dental       (+) Minor Abnormalities - some fillings, tiny chips      Cardiovascular   cardiovascular exam normal          Pulmonary   pulmonary exam normal                OUTSIDE LABS:  CBC:   Lab Results   Component Value Date    WBC 8.5 2024    HGB 12.2 2024    HCT 36.6 2024     2024     BMP:   Lab Results   Component Value Date     2024     (L)  "08/16/2024    POTASSIUM 5.0 08/17/2024    POTASSIUM 4.8 08/16/2024    CHLORIDE 100 08/17/2024    CHLORIDE 99 08/16/2024    CO2 22 08/17/2024    CO2 22 08/16/2024    BUN 29.5 (H) 08/17/2024    BUN 33.9 (H) 08/16/2024    CR 2.42 (H) 08/17/2024    CR 3.10 (H) 08/16/2024    GLC 85 08/17/2024    GLC 93 08/16/2024     COAGS: No results found for: \"PTT\", \"INR\", \"FIBR\"  POC: No results found for: \"BGM\", \"HCG\", \"HCGS\"  HEPATIC: No results found for: \"ALBUMIN\", \"PROTTOTAL\", \"ALT\", \"AST\", \"GGT\", \"ALKPHOS\", \"BILITOTAL\", \"BILIDIRECT\", \"SRI\"  OTHER:   Lab Results   Component Value Date    RAFFAELE 8.7 (L) 08/17/2024       Anesthesia Plan    ASA Status:  2    NPO Status:  NPO Appropriate    Anesthesia Type: General.     - Airway: ETT   Induction: Intravenous, Propofol.   Maintenance: Balanced.   Techniques and Equipment:     - Lines/Monitors: BIS     Consents    Anesthesia Plan(s) and associated risks, benefits, and realistic alternatives discussed. Questions answered and patient/representative(s) expressed understanding.     - Discussed: Risks, Benefits and Alternatives for the PROCEDURE were discussed     - Discussed with:  Patient      - Extended Intubation/Ventilatory Support Discussed: No.      - Patient is DNR/DNI Status: No     Use of blood products discussed: No .     Postoperative Care    Pain management: IV analgesics, Oral pain medications, Multi-modal analgesia.   PONV prophylaxis: Ondansetron (or other 5HT-3), Dexamethasone or Solumedrol     Comments:    Other Comments: GETTA with std monitors. Needs interpreters           Yves Humphrey MD    I have reviewed the pertinent notes and labs in the chart from the past 30 days and (re)examined the patient.  Any updates or changes from those notes are reflected in this note.     # Hyponatremia: Lowest Na = 130 mmol/L in last 30 days, will monitor as appropriate              "

## 2024-09-12 ENCOUNTER — APPOINTMENT (OUTPATIENT)
Dept: GENERAL RADIOLOGY | Facility: CLINIC | Age: 64
End: 2024-09-12
Attending: UROLOGY
Payer: COMMERCIAL

## 2024-09-12 ENCOUNTER — HOSPITAL ENCOUNTER (OUTPATIENT)
Facility: CLINIC | Age: 64
Discharge: HOME | End: 2024-09-13
Attending: UROLOGY
Payer: COMMERCIAL

## 2024-09-12 ENCOUNTER — ANESTHESIA (OUTPATIENT)
Dept: SURGERY | Facility: CLINIC | Age: 64
End: 2024-09-12
Payer: COMMERCIAL

## 2024-09-12 DIAGNOSIS — N20.0 RENAL STONE: Primary | ICD-10-CM

## 2024-09-12 DIAGNOSIS — N20.1 URETERAL STONE: ICD-10-CM

## 2024-09-12 LAB
ABO/RH(D): NORMAL
ALBUMIN SERPL BCG-MCNC: 4.4 G/DL (ref 3.5–5.2)
ALP SERPL-CCNC: 72 U/L (ref 40–150)
ALT SERPL W P-5'-P-CCNC: 21 U/L (ref 0–50)
ANION GAP SERPL CALCULATED.3IONS-SCNC: 12 MMOL/L (ref 7–15)
ANION GAP SERPL CALCULATED.3IONS-SCNC: 12 MMOL/L (ref 7–15)
ANTIBODY SCREEN: NEGATIVE
AST SERPL W P-5'-P-CCNC: 30 U/L (ref 0–45)
BILIRUB SERPL-MCNC: 0.5 MG/DL
BUN SERPL-MCNC: 24.4 MG/DL (ref 8–23)
BUN SERPL-MCNC: 24.7 MG/DL (ref 8–23)
CALCIUM SERPL-MCNC: 8.5 MG/DL (ref 8.8–10.4)
CALCIUM SERPL-MCNC: 9.5 MG/DL (ref 8.8–10.4)
CHLORIDE SERPL-SCNC: 104 MMOL/L (ref 98–107)
CHLORIDE SERPL-SCNC: 106 MMOL/L (ref 98–107)
CREAT SERPL-MCNC: 1.82 MG/DL (ref 0.51–0.95)
CREAT SERPL-MCNC: 1.86 MG/DL (ref 0.51–0.95)
EGFRCR SERPLBLD CKD-EPI 2021: 30 ML/MIN/1.73M2
EGFRCR SERPLBLD CKD-EPI 2021: 31 ML/MIN/1.73M2
ERYTHROCYTE [DISTWIDTH] IN BLOOD BY AUTOMATED COUNT: 12.5 % (ref 10–15)
GLUCOSE SERPL-MCNC: 115 MG/DL (ref 70–99)
GLUCOSE SERPL-MCNC: 96 MG/DL (ref 70–99)
HCO3 SERPL-SCNC: 20 MMOL/L (ref 22–29)
HCO3 SERPL-SCNC: 22 MMOL/L (ref 22–29)
HCT VFR BLD AUTO: 36.1 % (ref 35–47)
HGB BLD-MCNC: 12.5 G/DL (ref 11.7–15.7)
MCH RBC QN AUTO: 30.1 PG (ref 26.5–33)
MCHC RBC AUTO-ENTMCNC: 34.6 G/DL (ref 31.5–36.5)
MCV RBC AUTO: 87 FL (ref 78–100)
PLATELET # BLD AUTO: 162 10E3/UL (ref 150–450)
POTASSIUM SERPL-SCNC: 3.7 MMOL/L (ref 3.4–5.3)
POTASSIUM SERPL-SCNC: 4.2 MMOL/L (ref 3.4–5.3)
PROT SERPL-MCNC: 8.5 G/DL (ref 6.4–8.3)
RBC # BLD AUTO: 4.15 10E6/UL (ref 3.8–5.2)
SODIUM SERPL-SCNC: 138 MMOL/L (ref 135–145)
SODIUM SERPL-SCNC: 138 MMOL/L (ref 135–145)
SPECIMEN EXPIRATION DATE: NORMAL
WBC # BLD AUTO: 6.2 10E3/UL (ref 4–11)

## 2024-09-12 PROCEDURE — 258N000003 HC RX IP 258 OP 636

## 2024-09-12 PROCEDURE — 272N000001 HC OR GENERAL SUPPLY STERILE: Performed by: UROLOGY

## 2024-09-12 PROCEDURE — 370N000017 HC ANESTHESIA TECHNICAL FEE, PER MIN: Performed by: UROLOGY

## 2024-09-12 PROCEDURE — 360N000085 HC SURGERY LEVEL 5 W/ FLUORO, PER MIN: Performed by: UROLOGY

## 2024-09-12 PROCEDURE — 250N000009 HC RX 250

## 2024-09-12 PROCEDURE — 250N000011 HC RX IP 250 OP 636

## 2024-09-12 PROCEDURE — 50081 PERQ NL/PL LITHOTRP CPLX>2CM: CPT | Mod: 50 | Performed by: UROLOGY

## 2024-09-12 PROCEDURE — 82365 CALCULUS SPECTROSCOPY: CPT | Performed by: UROLOGY

## 2024-09-12 PROCEDURE — 250N000025 HC SEVOFLURANE, PER MIN: Performed by: UROLOGY

## 2024-09-12 PROCEDURE — C1725 CATH, TRANSLUMIN NON-LASER: HCPCS | Performed by: UROLOGY

## 2024-09-12 PROCEDURE — 82374 ASSAY BLOOD CARBON DIOXIDE: CPT | Performed by: UROLOGY

## 2024-09-12 PROCEDURE — C1747 HC OR ENDOSCOPE, SGL USE,URINARY TRACT, IMAGING/ILLUMINATION DEVICE: HCPCS | Performed by: UROLOGY

## 2024-09-12 PROCEDURE — 255N000002 HC RX 255 OP 636: Performed by: UROLOGY

## 2024-09-12 PROCEDURE — 250N000013 HC RX MED GY IP 250 OP 250 PS 637: Performed by: UROLOGY

## 2024-09-12 PROCEDURE — T1013 SIGN LANG/ORAL INTERPRETER: HCPCS | Mod: U4,TEL,95 | Performed by: INTERPRETER

## 2024-09-12 PROCEDURE — 999N000141 HC STATISTIC PRE-PROCEDURE NURSING ASSESSMENT: Performed by: UROLOGY

## 2024-09-12 PROCEDURE — 36415 COLL VENOUS BLD VENIPUNCTURE: CPT | Performed by: UROLOGY

## 2024-09-12 PROCEDURE — C1887 CATHETER, GUIDING: HCPCS | Performed by: UROLOGY

## 2024-09-12 PROCEDURE — 120N000002 HC R&B MED SURG/OB UMMC

## 2024-09-12 PROCEDURE — 999N000180 XR SURGERY CARM FLUORO LESS THAN 5 MIN: Mod: TC

## 2024-09-12 PROCEDURE — C1894 INTRO/SHEATH, NON-LASER: HCPCS | Performed by: UROLOGY

## 2024-09-12 PROCEDURE — 250N000011 HC RX IP 250 OP 636: Performed by: UROLOGY

## 2024-09-12 PROCEDURE — 82247 BILIRUBIN TOTAL: CPT | Performed by: UROLOGY

## 2024-09-12 PROCEDURE — 86900 BLOOD TYPING SEROLOGIC ABO: CPT | Performed by: UROLOGY

## 2024-09-12 PROCEDURE — C1769 GUIDE WIRE: HCPCS | Performed by: UROLOGY

## 2024-09-12 PROCEDURE — 50081 PERQ NL/PL LITHOTRP CPLX>2CM: CPT | Performed by: ANESTHESIOLOGY

## 2024-09-12 PROCEDURE — 710N000010 HC RECOVERY PHASE 1, LEVEL 2, PER MIN: Performed by: UROLOGY

## 2024-09-12 PROCEDURE — 85027 COMPLETE CBC AUTOMATED: CPT | Performed by: UROLOGY

## 2024-09-12 PROCEDURE — C2617 STENT, NON-COR, TEM W/O DEL: HCPCS | Performed by: UROLOGY

## 2024-09-12 PROCEDURE — 36592 COLLECT BLOOD FROM PICC: CPT | Performed by: UROLOGY

## 2024-09-12 PROCEDURE — 87070 CULTURE OTHR SPECIMN AEROBIC: CPT | Performed by: UROLOGY

## 2024-09-12 PROCEDURE — 50436 DILAT XST TRC NDURLGC PX: CPT | Mod: 50 | Performed by: UROLOGY

## 2024-09-12 PROCEDURE — C1758 CATHETER, URETERAL: HCPCS | Performed by: UROLOGY

## 2024-09-12 DEVICE — URETERAL STENT
Type: IMPLANTABLE DEVICE | Site: URETER | Status: NON-FUNCTIONAL
Brand: PERCUFLEX™ PLUS
Removed: 2024-10-04

## 2024-09-12 RX ORDER — HYDROMORPHONE HYDROCHLORIDE 1 MG/ML
0.4 INJECTION, SOLUTION INTRAMUSCULAR; INTRAVENOUS; SUBCUTANEOUS
Status: DISCONTINUED | OUTPATIENT
Start: 2024-09-12 | End: 2024-09-13 | Stop reason: HOSPADM

## 2024-09-12 RX ORDER — PROCHLORPERAZINE MALEATE 5 MG
10 TABLET ORAL EVERY 6 HOURS PRN
Status: DISCONTINUED | OUTPATIENT
Start: 2024-09-12 | End: 2024-09-13 | Stop reason: HOSPADM

## 2024-09-12 RX ORDER — NALOXONE HYDROCHLORIDE 0.4 MG/ML
0.1 INJECTION, SOLUTION INTRAMUSCULAR; INTRAVENOUS; SUBCUTANEOUS
Status: DISCONTINUED | OUTPATIENT
Start: 2024-09-12 | End: 2024-09-12

## 2024-09-12 RX ORDER — BISACODYL 10 MG
10 SUPPOSITORY, RECTAL RECTAL DAILY PRN
Status: DISCONTINUED | OUTPATIENT
Start: 2024-09-15 | End: 2024-09-13 | Stop reason: HOSPADM

## 2024-09-12 RX ORDER — ACETAMINOPHEN 325 MG/1
975 TABLET ORAL ONCE
Status: COMPLETED | OUTPATIENT
Start: 2024-09-12 | End: 2024-09-12

## 2024-09-12 RX ORDER — ONDANSETRON 2 MG/ML
INJECTION INTRAMUSCULAR; INTRAVENOUS PRN
Status: DISCONTINUED | OUTPATIENT
Start: 2024-09-12 | End: 2024-09-12

## 2024-09-12 RX ORDER — FAMOTIDINE 20 MG/1
20 TABLET, FILM COATED ORAL
Status: DISCONTINUED | OUTPATIENT
Start: 2024-09-12 | End: 2024-09-13 | Stop reason: HOSPADM

## 2024-09-12 RX ORDER — OXYCODONE HYDROCHLORIDE 5 MG/1
5 TABLET ORAL EVERY 4 HOURS PRN
Status: DISCONTINUED | OUTPATIENT
Start: 2024-09-12 | End: 2024-09-13 | Stop reason: HOSPADM

## 2024-09-12 RX ORDER — NALOXONE HYDROCHLORIDE 0.4 MG/ML
0.1 INJECTION, SOLUTION INTRAMUSCULAR; INTRAVENOUS; SUBCUTANEOUS
Status: DISCONTINUED | OUTPATIENT
Start: 2024-09-12 | End: 2024-09-12 | Stop reason: HOSPADM

## 2024-09-12 RX ORDER — LABETALOL HYDROCHLORIDE 5 MG/ML
10 INJECTION, SOLUTION INTRAVENOUS
Status: DISCONTINUED | OUTPATIENT
Start: 2024-09-12 | End: 2024-09-12 | Stop reason: HOSPADM

## 2024-09-12 RX ORDER — TOLTERODINE 2 MG/1
2 CAPSULE, EXTENDED RELEASE ORAL DAILY
Status: DISCONTINUED | OUTPATIENT
Start: 2024-09-12 | End: 2024-09-13 | Stop reason: HOSPADM

## 2024-09-12 RX ORDER — ACETAMINOPHEN 325 MG/1
975 TABLET ORAL ONCE
Status: DISCONTINUED | OUTPATIENT
Start: 2024-09-12 | End: 2024-09-12

## 2024-09-12 RX ORDER — FENTANYL CITRATE 50 UG/ML
25 INJECTION, SOLUTION INTRAMUSCULAR; INTRAVENOUS EVERY 5 MIN PRN
Status: DISCONTINUED | OUTPATIENT
Start: 2024-09-12 | End: 2024-09-12 | Stop reason: HOSPADM

## 2024-09-12 RX ORDER — AMOXICILLIN 250 MG
1 CAPSULE ORAL 2 TIMES DAILY
Status: DISCONTINUED | OUTPATIENT
Start: 2024-09-12 | End: 2024-09-13 | Stop reason: HOSPADM

## 2024-09-12 RX ORDER — HYDROMORPHONE HYDROCHLORIDE 1 MG/ML
0.2 INJECTION, SOLUTION INTRAMUSCULAR; INTRAVENOUS; SUBCUTANEOUS EVERY 5 MIN PRN
Status: DISCONTINUED | OUTPATIENT
Start: 2024-09-12 | End: 2024-09-12 | Stop reason: HOSPADM

## 2024-09-12 RX ORDER — POLYETHYLENE GLYCOL 3350 17 G/17G
17 POWDER, FOR SOLUTION ORAL DAILY
Status: DISCONTINUED | OUTPATIENT
Start: 2024-09-13 | End: 2024-09-13 | Stop reason: HOSPADM

## 2024-09-12 RX ORDER — ACETAMINOPHEN 650 MG/1
650 SUPPOSITORY RECTAL ONCE
Status: COMPLETED | OUTPATIENT
Start: 2024-09-12 | End: 2024-09-12

## 2024-09-12 RX ORDER — IOPAMIDOL 510 MG/ML
INJECTION, SOLUTION INTRAVASCULAR PRN
Status: DISCONTINUED | OUTPATIENT
Start: 2024-09-12 | End: 2024-09-12 | Stop reason: HOSPADM

## 2024-09-12 RX ORDER — CEFTRIAXONE 1 G/1
1 INJECTION, POWDER, FOR SOLUTION INTRAMUSCULAR; INTRAVENOUS EVERY 24 HOURS
Status: DISCONTINUED | OUTPATIENT
Start: 2024-09-12 | End: 2024-09-12 | Stop reason: HOSPADM

## 2024-09-12 RX ORDER — OXYCODONE HYDROCHLORIDE 10 MG/1
10 TABLET ORAL
Status: DISCONTINUED | OUTPATIENT
Start: 2024-09-12 | End: 2024-09-12 | Stop reason: HOSPADM

## 2024-09-12 RX ORDER — ONDANSETRON 2 MG/ML
4 INJECTION INTRAMUSCULAR; INTRAVENOUS EVERY 30 MIN PRN
Status: DISCONTINUED | OUTPATIENT
Start: 2024-09-12 | End: 2024-09-12

## 2024-09-12 RX ORDER — PROPOFOL 10 MG/ML
INJECTION, EMULSION INTRAVENOUS PRN
Status: DISCONTINUED | OUTPATIENT
Start: 2024-09-12 | End: 2024-09-12

## 2024-09-12 RX ORDER — ACETAMINOPHEN 325 MG/1
975 TABLET ORAL EVERY 8 HOURS
Status: DISCONTINUED | OUTPATIENT
Start: 2024-09-12 | End: 2024-09-13 | Stop reason: HOSPADM

## 2024-09-12 RX ORDER — LIDOCAINE 40 MG/G
CREAM TOPICAL
Status: DISCONTINUED | OUTPATIENT
Start: 2024-09-12 | End: 2024-09-13 | Stop reason: HOSPADM

## 2024-09-12 RX ORDER — LIDOCAINE HYDROCHLORIDE 20 MG/ML
INJECTION, SOLUTION INFILTRATION; PERINEURAL PRN
Status: DISCONTINUED | OUTPATIENT
Start: 2024-09-12 | End: 2024-09-12

## 2024-09-12 RX ORDER — BUPIVACAINE HYDROCHLORIDE 2.5 MG/ML
INJECTION, SOLUTION EPIDURAL; INFILTRATION; INTRACAUDAL PRN
Status: DISCONTINUED | OUTPATIENT
Start: 2024-09-12 | End: 2024-09-12 | Stop reason: HOSPADM

## 2024-09-12 RX ORDER — SODIUM CHLORIDE 9 MG/ML
INJECTION, SOLUTION INTRAVENOUS CONTINUOUS
Status: DISCONTINUED | OUTPATIENT
Start: 2024-09-12 | End: 2024-09-13 | Stop reason: HOSPADM

## 2024-09-12 RX ORDER — DEXAMETHASONE SODIUM PHOSPHATE 4 MG/ML
INJECTION, SOLUTION INTRA-ARTICULAR; INTRALESIONAL; INTRAMUSCULAR; INTRAVENOUS; SOFT TISSUE PRN
Status: DISCONTINUED | OUTPATIENT
Start: 2024-09-12 | End: 2024-09-12

## 2024-09-12 RX ORDER — LIDOCAINE 40 MG/G
CREAM TOPICAL
Status: DISCONTINUED | OUTPATIENT
Start: 2024-09-12 | End: 2024-09-12 | Stop reason: HOSPADM

## 2024-09-12 RX ORDER — CALCIUM CARBONATE 500 MG/1
500 TABLET, CHEWABLE ORAL 4 TIMES DAILY PRN
Status: DISCONTINUED | OUTPATIENT
Start: 2024-09-12 | End: 2024-09-13 | Stop reason: HOSPADM

## 2024-09-12 RX ORDER — SODIUM CHLORIDE, SODIUM LACTATE, POTASSIUM CHLORIDE, CALCIUM CHLORIDE 600; 310; 30; 20 MG/100ML; MG/100ML; MG/100ML; MG/100ML
INJECTION, SOLUTION INTRAVENOUS CONTINUOUS
Status: DISCONTINUED | OUTPATIENT
Start: 2024-09-12 | End: 2024-09-12 | Stop reason: HOSPADM

## 2024-09-12 RX ORDER — ALLOPURINOL 100 MG/1
200 TABLET ORAL DAILY
Status: DISCONTINUED | OUTPATIENT
Start: 2024-09-13 | End: 2024-09-13 | Stop reason: HOSPADM

## 2024-09-12 RX ORDER — NALOXONE HYDROCHLORIDE 0.4 MG/ML
0.4 INJECTION, SOLUTION INTRAMUSCULAR; INTRAVENOUS; SUBCUTANEOUS
Status: DISCONTINUED | OUTPATIENT
Start: 2024-09-12 | End: 2024-09-13 | Stop reason: HOSPADM

## 2024-09-12 RX ORDER — SODIUM CHLORIDE, SODIUM LACTATE, POTASSIUM CHLORIDE, CALCIUM CHLORIDE 600; 310; 30; 20 MG/100ML; MG/100ML; MG/100ML; MG/100ML
INJECTION, SOLUTION INTRAVENOUS CONTINUOUS
Status: DISCONTINUED | OUTPATIENT
Start: 2024-09-12 | End: 2024-09-12

## 2024-09-12 RX ORDER — DEXAMETHASONE SODIUM PHOSPHATE 4 MG/ML
4 INJECTION, SOLUTION INTRA-ARTICULAR; INTRALESIONAL; INTRAMUSCULAR; INTRAVENOUS; SOFT TISSUE
Status: DISCONTINUED | OUTPATIENT
Start: 2024-09-12 | End: 2024-09-12

## 2024-09-12 RX ORDER — FENTANYL CITRATE 50 UG/ML
INJECTION, SOLUTION INTRAMUSCULAR; INTRAVENOUS PRN
Status: DISCONTINUED | OUTPATIENT
Start: 2024-09-12 | End: 2024-09-12

## 2024-09-12 RX ORDER — HYDROXYZINE HYDROCHLORIDE 25 MG/1
25 TABLET, FILM COATED ORAL EVERY 6 HOURS PRN
Status: DISCONTINUED | OUTPATIENT
Start: 2024-09-12 | End: 2024-09-13 | Stop reason: HOSPADM

## 2024-09-12 RX ORDER — ONDANSETRON 4 MG/1
4 TABLET, ORALLY DISINTEGRATING ORAL EVERY 30 MIN PRN
Status: DISCONTINUED | OUTPATIENT
Start: 2024-09-12 | End: 2024-09-12 | Stop reason: HOSPADM

## 2024-09-12 RX ORDER — ONDANSETRON 2 MG/ML
4 INJECTION INTRAMUSCULAR; INTRAVENOUS EVERY 6 HOURS PRN
Status: DISCONTINUED | OUTPATIENT
Start: 2024-09-12 | End: 2024-09-13 | Stop reason: HOSPADM

## 2024-09-12 RX ORDER — FENTANYL CITRATE 50 UG/ML
50 INJECTION, SOLUTION INTRAMUSCULAR; INTRAVENOUS EVERY 5 MIN PRN
Status: DISCONTINUED | OUTPATIENT
Start: 2024-09-12 | End: 2024-09-12 | Stop reason: HOSPADM

## 2024-09-12 RX ORDER — TAMSULOSIN HYDROCHLORIDE 0.4 MG/1
0.4 CAPSULE ORAL DAILY
Status: DISCONTINUED | OUTPATIENT
Start: 2024-09-12 | End: 2024-09-13 | Stop reason: HOSPADM

## 2024-09-12 RX ORDER — ONDANSETRON 2 MG/ML
4 INJECTION INTRAMUSCULAR; INTRAVENOUS EVERY 30 MIN PRN
Status: DISCONTINUED | OUTPATIENT
Start: 2024-09-12 | End: 2024-09-12 | Stop reason: HOSPADM

## 2024-09-12 RX ORDER — DEXAMETHASONE SODIUM PHOSPHATE 4 MG/ML
4 INJECTION, SOLUTION INTRA-ARTICULAR; INTRALESIONAL; INTRAMUSCULAR; INTRAVENOUS; SOFT TISSUE
Status: DISCONTINUED | OUTPATIENT
Start: 2024-09-12 | End: 2024-09-12 | Stop reason: HOSPADM

## 2024-09-12 RX ORDER — SODIUM CHLORIDE, SODIUM LACTATE, POTASSIUM CHLORIDE, CALCIUM CHLORIDE 600; 310; 30; 20 MG/100ML; MG/100ML; MG/100ML; MG/100ML
INJECTION, SOLUTION INTRAVENOUS CONTINUOUS PRN
Status: DISCONTINUED | OUTPATIENT
Start: 2024-09-12 | End: 2024-09-12

## 2024-09-12 RX ORDER — ONDANSETRON 4 MG/1
4 TABLET, ORALLY DISINTEGRATING ORAL EVERY 30 MIN PRN
Status: DISCONTINUED | OUTPATIENT
Start: 2024-09-12 | End: 2024-09-12

## 2024-09-12 RX ORDER — NALOXONE HYDROCHLORIDE 0.4 MG/ML
0.2 INJECTION, SOLUTION INTRAMUSCULAR; INTRAVENOUS; SUBCUTANEOUS
Status: DISCONTINUED | OUTPATIENT
Start: 2024-09-12 | End: 2024-09-13 | Stop reason: HOSPADM

## 2024-09-12 RX ORDER — CEFTRIAXONE 1 G/1
1 INJECTION, POWDER, FOR SOLUTION INTRAMUSCULAR; INTRAVENOUS EVERY 24 HOURS
Status: DISCONTINUED | OUTPATIENT
Start: 2024-09-13 | End: 2024-09-13 | Stop reason: HOSPADM

## 2024-09-12 RX ORDER — ACETAMINOPHEN 325 MG/1
650 TABLET ORAL EVERY 4 HOURS PRN
Status: DISCONTINUED | OUTPATIENT
Start: 2024-09-15 | End: 2024-09-13 | Stop reason: HOSPADM

## 2024-09-12 RX ORDER — HYDROMORPHONE HYDROCHLORIDE 1 MG/ML
0.2 INJECTION, SOLUTION INTRAMUSCULAR; INTRAVENOUS; SUBCUTANEOUS
Status: DISCONTINUED | OUTPATIENT
Start: 2024-09-12 | End: 2024-09-13 | Stop reason: HOSPADM

## 2024-09-12 RX ORDER — OXYCODONE HYDROCHLORIDE 5 MG/1
5 TABLET ORAL
Status: DISCONTINUED | OUTPATIENT
Start: 2024-09-12 | End: 2024-09-12 | Stop reason: HOSPADM

## 2024-09-12 RX ORDER — OXYCODONE HYDROCHLORIDE 10 MG/1
10 TABLET ORAL EVERY 4 HOURS PRN
Status: DISCONTINUED | OUTPATIENT
Start: 2024-09-12 | End: 2024-09-13 | Stop reason: HOSPADM

## 2024-09-12 RX ORDER — ONDANSETRON 4 MG/1
4 TABLET, ORALLY DISINTEGRATING ORAL EVERY 6 HOURS PRN
Status: DISCONTINUED | OUTPATIENT
Start: 2024-09-12 | End: 2024-09-13 | Stop reason: HOSPADM

## 2024-09-12 RX ORDER — HYDROMORPHONE HYDROCHLORIDE 1 MG/ML
0.4 INJECTION, SOLUTION INTRAMUSCULAR; INTRAVENOUS; SUBCUTANEOUS EVERY 5 MIN PRN
Status: DISCONTINUED | OUTPATIENT
Start: 2024-09-12 | End: 2024-09-12 | Stop reason: HOSPADM

## 2024-09-12 RX ADMIN — ACETAMINOPHEN 975 MG: 325 TABLET ORAL at 18:20

## 2024-09-12 RX ADMIN — ONDANSETRON 4 MG: 2 INJECTION INTRAMUSCULAR; INTRAVENOUS at 14:20

## 2024-09-12 RX ADMIN — DEXAMETHASONE SODIUM PHOSPHATE 6 MG: 4 INJECTION, SOLUTION INTRAMUSCULAR; INTRAVENOUS at 12:44

## 2024-09-12 RX ADMIN — FENTANYL CITRATE 25 MCG: 50 INJECTION INTRAMUSCULAR; INTRAVENOUS at 14:52

## 2024-09-12 RX ADMIN — ONDANSETRON 4 MG: 4 TABLET, ORALLY DISINTEGRATING ORAL at 17:17

## 2024-09-12 RX ADMIN — Medication 20 MG: at 13:24

## 2024-09-12 RX ADMIN — PHENYLEPHRINE HYDROCHLORIDE 100 MCG: 10 INJECTION INTRAVENOUS at 12:51

## 2024-09-12 RX ADMIN — SODIUM CHLORIDE: 9 INJECTION, SOLUTION INTRAVENOUS at 18:24

## 2024-09-12 RX ADMIN — MIDAZOLAM 2 MG: 1 INJECTION INTRAMUSCULAR; INTRAVENOUS at 11:37

## 2024-09-12 RX ADMIN — PHENYLEPHRINE HYDROCHLORIDE 100 MCG: 10 INJECTION INTRAVENOUS at 12:03

## 2024-09-12 RX ADMIN — Medication 50 MG: at 11:46

## 2024-09-12 RX ADMIN — HYDROMORPHONE HYDROCHLORIDE 0.2 MG: 1 INJECTION, SOLUTION INTRAMUSCULAR; INTRAVENOUS; SUBCUTANEOUS at 16:06

## 2024-09-12 RX ADMIN — SODIUM CHLORIDE, POTASSIUM CHLORIDE, SODIUM LACTATE AND CALCIUM CHLORIDE: 600; 310; 30; 20 INJECTION, SOLUTION INTRAVENOUS at 11:39

## 2024-09-12 RX ADMIN — TAMSULOSIN HYDROCHLORIDE 0.4 MG: 0.4 CAPSULE ORAL at 20:03

## 2024-09-12 RX ADMIN — TOLTERODINE 2 MG: 2 CAPSULE, EXTENDED RELEASE ORAL at 18:21

## 2024-09-12 RX ADMIN — PROPOFOL 100 MG: 10 INJECTION, EMULSION INTRAVENOUS at 11:46

## 2024-09-12 RX ADMIN — ACETAMINOPHEN 975 MG: 325 TABLET ORAL at 10:50

## 2024-09-12 RX ADMIN — SUGAMMADEX 200 MG: 100 INJECTION, SOLUTION INTRAVENOUS at 14:51

## 2024-09-12 RX ADMIN — LIDOCAINE HYDROCHLORIDE 60 MG: 20 INJECTION, SOLUTION INFILTRATION; PERINEURAL at 11:45

## 2024-09-12 RX ADMIN — PHENYLEPHRINE HYDROCHLORIDE 100 MCG: 10 INJECTION INTRAVENOUS at 12:11

## 2024-09-12 RX ADMIN — CEFTRIAXONE 1 G: 1 INJECTION, POWDER, FOR SOLUTION INTRAMUSCULAR; INTRAVENOUS at 11:53

## 2024-09-12 RX ADMIN — SENNOSIDES AND DOCUSATE SODIUM 1 TABLET: 50; 8.6 TABLET ORAL at 20:06

## 2024-09-12 RX ADMIN — FENTANYL CITRATE 50 MCG: 50 INJECTION INTRAMUSCULAR; INTRAVENOUS at 11:47

## 2024-09-12 RX ADMIN — FAMOTIDINE 20 MG: 20 TABLET ORAL at 18:20

## 2024-09-12 ASSESSMENT — ACTIVITIES OF DAILY LIVING (ADL)
ADLS_ACUITY_SCORE: 29
ADLS_ACUITY_SCORE: 20
ADLS_ACUITY_SCORE: 29
ADLS_ACUITY_SCORE: 20
ADLS_ACUITY_SCORE: 29
ADLS_ACUITY_SCORE: 29
ADLS_ACUITY_SCORE: 20
ADLS_ACUITY_SCORE: 29
ADLS_ACUITY_SCORE: 20
ADLS_ACUITY_SCORE: 29

## 2024-09-12 NOTE — OP NOTE
PREOPERATIVE DIAGNOSIS:  Bilateral large renal stones  Largest stone size:2.2 cm left, 2.4 cm right    POSTOPERATIVE DIAGNOSIS: Same    PROCEDURES:  Cystoscopy  Left percutaneous renal access  Left dilation of percutaneous renal access tract  Left percutaneous nephrolithotomy for stone > 2cm  Right percutaneous renal access  Right dilation of percutaneous renal access tract  RIght percutaneous nephrolithotomy for stone > 2cm  Bilateral ureteral stent exchange   Bilateral antegrade nephrostogram    SURGEON: Dr. Robbie Ohara MD present and participated for the entirety of the procedure    FELLOW Javier rAthur MD (in a teaching capacity)  RESIDENT SURGEON: Riki Coronado MD       ESTIMATED BLOOD LOSS: 100 mL.     SPECIMENS:   ID Type Source Tests Collected by Time Destination   A : LEFT AND RIGHT KIDNEY STONES FOR CULTURE Calculus/Stone Kidney, Left AEROBIC BACTERIAL CULTURE ROUTINE Robbie Ohara MD 9/12/2024  2:15 PM    B : LEFT AND RIGHT KIDNEY STONES FOR ANALYSIS Calculus/Stone Kidney, Left STONE ANALYSIS Robbie Ohara MD 9/12/2024  2:15 PM          DRAINS:  16fr conde catheter with 10mL in the balloon  Bilateral 7fr 22cm stents    COMPLICATIONS: none    SIGNIFICANT FINDINGS:   Visually stone free at the end of the case    OPERATIVE INDICATIONS:  Zoraida Marie who is a 64 year old female here for percutaneous nephrolithotomy.  she was recently diagnosed with kidney stones and was counseled regarding available treatment options and associated risks.  she elected to proceed with percutaneous nephrolithotomy.  she is aware of the risks of surgery.    OPERATIVE DETAILS: After informed consent was obtained, the patient was taken back to the operating room. Anesthesia was induced and the patient was intubated. IV culture directed antibiotics were given and bilateral sequential compression devices were placed. Prep and drape was done in the usual fashion. A timeout was taken to ensure proper patient,  placement and procedure.      The patient was then carefully placed in the prone position onto the operating room table ensuring padding of shoulders and all pressure points.   imaging revealed significant stone burden and a retrograde pyelogram was performed showing the renal anatomy to be bifid on the left.    Percutaneous access to the left mid pole was undertaken using ultrasound guidance.  A hydrophilic glidewire was passed through the obturator of the 18g access needle and was navigated down the ureter using an angle tipped catheter.  The glidewire was then exchnged for a superstiff wire. An 8/10 coaxial dilator was passed over the wire after a skin incision was made 1cm along the wire. A sensor wire was passed to use as a safety wire.  With 2 wires in place we then used a 24 F balloon dilator over the Super Stiff guidewire and used this to dilate our percutaneous tract. This was monitored with fluoroscopy. The sheath was advanced into the calyx of access. At this point we deflated the balloon and removed the dilator. We then inserted the rigid nephroscope into the dilated tract and removed all accessible stones using the trilogy probe.  We then switched to the flexible cystoscope and ureteroscope to clear the remaining calyces.    Fragments were basketed out. We performed antegrade ureteroscopy to remove stone fragments from the ureter ensured that the ureter was patent with no evidence of residual ureteral calculi or ureteral trauma.   Next, we mapped out the kidney meticulously using the endoscope as well as fluoroscopy.  We ensured that there were no residual radiopaque calculi and that we had visualized all accessible calyces.    We next performed cystoscopy to remove the bilateral ureteral stents from the bladder.  We passed a new 7 x 22 double J stent up the left ureter confirming a curl in the kidney and bladder.  We passed a ureteroscope up the right ureter to help guide percutaneous access.   We  turned our attention to the right kidney, where we gained access in the same fashion as the left this time in the lower pole.  We advanced two wired and dilated to 24F with a balloon.  We passed our sheath over the balloon and into the collecting system.. We then inserted the rigid nephroscope into the dilated tract and removed all accessible stones. On this side the upper pole stone required a flexible nephroscope and holmium laser 200 micron at 1J and 15Hz to clear the upper pole stone.  . Fragments were basketed out as able. We performed antegrade ureteroscopy to remove stone fragments from the ureter ensured that the ureter was patent with no evidence of residual ureteral calculi or ureteral trauma.   Next, we mapped out the kidney meticulously using the endoscope as well as fluoroscopy.  We ensured that there were no residual radiopaque calculi and that we had visualized all accessible calyces.  We then placed a 7fr 22cm ureteral stent in an antegrade fashion.  Both stents were seen on fluoro and flexible nephroscopy to be in place with good curl in the bladder and respective upper tracts.  We injected local anesthetic into both wounds.  Our access was subcostal so we did not perform a chest x ray.  We placed a clean dressing over each wound and held several minutes or pressure ensuring there was no significant bleeding.  At this point, the patient was carefully placed back into the supine position, awakened from anesthesia and extubated. she was transferred to the PACU in stable condition. she tolerated the procedure well without complication.      PLAN:    Overnight observation   CT in the am  CTX while inhouse   Remove 5fr ureteral catheter in am  TOV in am    I, Robbie Ohara, was present and participatory for the entirety of the procedure

## 2024-09-12 NOTE — PROGRESS NOTES
Admission Note    Reason for admission: Nephrolithotomy  Primary team notified of pt arrival.  Admitted from: PACU  Via: Patient transport  Accompanied by: Family  Belongings: Placed in closet; valuables sent home with family  Admission Required Doc Completed: Maikel  Teaching: Orientation to unit and call light- call light within reach, use of console, meal times, when to call for the RN, and enforced importance of safety.  IV Access: R PIV  Telemetry: No  Ht./Wt.: Completed  Code Status verified on armband: Yes/No  2 RN Skin Assessment Completed with: Roxy RN  Suction/Ambu bag/Flowmeter at bedside: Yes/No    Pt status:     Temp:  [97.5  F (36.4  C)-98  F (36.7  C)] 98  F (36.7  C)  Pulse:  [68-79] 69  Resp:  [12-20] 15  BP: ()/(68-85) 102/70  SpO2:  [97 %-100 %] 99 %

## 2024-09-12 NOTE — ANESTHESIA POSTPROCEDURE EVALUATION
Patient: Zoraida Marie    Procedure: Procedure(s):  NEPHROLITHOTOMY, PERCUTANEOUS, USING HOLMIUM LASER AND  bilateral ureteral stent exchange       Anesthesia Type:  General    Note:  Disposition: Inpatient   Postop Pain Control: Uneventful            Sign Out: Well controlled pain   PONV: No   Neuro/Psych: Uneventful            Sign Out: Acceptable/Baseline neuro status   Airway/Respiratory: Uneventful            Sign Out: Acceptable/Baseline resp. status   CV/Hemodynamics: Uneventful            Sign Out: Acceptable CV status; No obvious hypovolemia; No obvious fluid overload   Other NRE: NONE   DID A NON-ROUTINE EVENT OCCUR? No       Last vitals:  Vitals Value Taken Time   /72 09/12/24 1545   Temp 36.4  C (97.5  F) 09/12/24 1502   Pulse 63 09/12/24 1547   Resp 16 09/12/24 1547   SpO2 100 % 09/12/24 1547   Vitals shown include unfiled device data.    Electronically Signed By: Shagufta Kulkarni MD  September 12, 2024  3:48 PM

## 2024-09-12 NOTE — PLAN OF CARE
"Goal Outcome Evaluation:      Plan of Care Reviewed With: patient    Overall Patient Progress: improvingOverall Patient Progress: improving         Patient admitted 24 from PACU for nephrolithotomy    VS: /74   Pulse 63   Temp 98.3  F (36.8  C) (Oral)   Resp 16   Ht 1.448 m (4' 9\")   Wt 50.8 kg (111 lb 15.9 oz)   SpO2 98%   BMI 24.24 kg/m      CAPNO in place     O2: >90% on RA no complaints of SOB or chest pain.   Output: Ackerman in place   Last BM: 24 per patient   Activity: Not OOB    Skin: Intact, Abrasion to bilateral top of feet.   Pain: 3/10   CMS: AOX4 - Hmong speaking   Dressin dressings to bilateral lower back to incisions.   Diet: Clear liquid, Zofran given due to vomiting.   LDA: Ackerman in place  R PIV - infusing 75mL/h NS   Equipment: Personal belongings   Plan: Possible trial to void tomorrow and discharge home.   Additional Info:        "

## 2024-09-12 NOTE — ANESTHESIA CARE TRANSFER NOTE
Patient: Zoraida Marie    Procedure: Procedure(s):  NEPHROLITHOTOMY, PERCUTANEOUS, USING HOLMIUM LASER AND  bilateral ureteral stent exchange       Diagnosis: Kidney stone [N20.0]  Diagnosis Additional Information: No value filed.    Anesthesia Type:   General     Note:    Oropharynx: oropharynx clear of all foreign objects and spontaneously breathing  Level of Consciousness: awake  Oxygen Supplementation: face mask  Level of Supplemental Oxygen (L/min / FiO2): 6  Independent Airway: airway patency satisfactory and stable  Dentition: dentition unchanged  Vital Signs Stable: post-procedure vital signs reviewed and stable  Report to RN Given: handoff report given  Patient transferred to: PACU    Handoff Report: Identifed the Patient, Identified the Reponsible Provider, Reviewed the pertinent medical history, Discussed the surgical course, Reviewed Intra-OP anesthesia mangement and issues during anesthesia, Set expectations for post-procedure period and Allowed opportunity for questions and acknowledgement of understanding      Vitals:  Vitals Value Taken Time   /85 09/12/24 1501   Temp     Pulse 79 09/12/24 1508   Resp 15 09/12/24 1508   SpO2 99 % 09/12/24 1508   Vitals shown include unfiled device data.    Electronically Signed By: Yves Humphrey MD  September 12, 2024  3:09 PM

## 2024-09-12 NOTE — PROGRESS NOTES
"PACU to Inpatient Nursing Handoff    Patient Zoraida Marie is a 64 year old female who speaks Hmong.   Procedure Procedure(s):  NEPHROLITHOTOMY, PERCUTANEOUS, USING HOLMIUM LASER AND  bilateral ureteral stent exchange   Surgeon(s) Primary: Robbie Ohara MD  Resident - Assisting: Riki Coronado MD  Fellow - Assisting: Raul Arthur MD     No Known Allergies    Isolation  No active isolations     Past Medical History   has no past medical history on file.    Anesthesia General   Dermatome Level     Preop Meds acetaminophen (Tylenol) - time given: 1050   Nerve block Not applicable   Intraop Meds dexamethasone (Decadron)  fentanyl (Sublimaze): 50 mcg total  ondansetron (Zofran): last given at 1420   Local Meds Yes   Antibiotics Cefitriaxone 1g - last given at 1153     Pain Patient Currently in Pain: denies   PACU meds  hydromorphone (Dilaudid): 0.2 mg (total dose) last given at 1606    PCA / epidural No   Capnography     Telemetry     Inpatient Telemetry Monitor Ordered? No        Labs Glucose Lab Results   Component Value Date    GLC 96 09/12/2024     08/14/2024       Hgb Lab Results   Component Value Date    HGB 12.2 08/14/2024       INR No results found for: \"INR\"   PACU Imaging Not applicable     Wound/Incision Incision/Surgical Site 09/12/24 Left Back (Active)   Incision Assessment UTV 09/12/24 1502   Dressing Per Plan of Care 09/12/24 1502   Closure Sutures 09/12/24 1502   Incision Drainage Amount None 09/12/24 1502   Dressing Intervention Clean, dry, intact 09/12/24 1502   Number of days: 0       Incision/Surgical Site 09/12/24 Right Back (Active)   Incision Assessment UTV 09/12/24 1502   Dressing Per Plan of Care 09/12/24 1502   Closure Sutures 09/12/24 1502   Incision Drainage Amount None 09/12/24 1502   Dressing Intervention Clean, dry, intact 09/12/24 1502   Number of days: 0      CMS        Equipment Not applicable   Other LDA         IV Access Peripheral IV 09/12/24 Anterior;Right Lower " forearm (Active)   Site Assessment WDL 09/12/24 1502   Line Status Infusing 09/12/24 1502   Dressing Transparent 09/12/24 1502   Dressing Status clean;dry;intact 09/12/24 1502   Dressing Intervention New dressing  09/12/24 1054   Line Intervention Flushed 09/12/24 1054   Phlebitis Scale 0-->no symptoms 09/12/24 1502   Infiltration? no 09/12/24 1502   Number of days: 0      Blood Products Not applicable EBL 100ml   Intake/Output Date 09/12/24 0700 - 09/13/24 0659   Shift 6671-2683 7524-0924 3158-4251 24 Hour Total   INTAKE   I.V. 800   800   Shift Total(mL/kg) 800(15.75)   800(15.75)   OUTPUT   Shift Total(mL/kg)       Weight (kg) 50.8 50.8 50.8 50.8      Drains / Ackerman Urethral Catheter 09/12/24 Latex 16 fr (Active)   Securement Method Securing device (Describe) 09/12/24 1444   Number of days: 0      Time of void PreOp Time of Void Prior to Procedure: 1005 (09/12/24 1014)    PostOp Urine Occurrence: 1 (09/12/24 1030)    Diapered? No   Bladder Scan     PO    tolerating sips     Vitals    B/P: 111/73  T: 97.5  F (36.4  C)    Temp src: Axillary  P:  Pulse: 73 (09/12/24 1515)          R: 15  O2:  SpO2: 98 %    O2 Device: None (Room air) (09/12/24 1515)              Family/support present Daughter- Tamy De Luna & Onofre (son and daughter in law)   Patient belongings  1 bag   Patient transported on cart   DC meds/scripts (obs/outpt) Not applicable   Inpatient Pain Meds Released? Yes       Special needs/considerations Hmong speaking   Tasks needing completion CT in AM; CTX while in-house       MAYURI Doss

## 2024-09-12 NOTE — BRIEF OP NOTE
[facility]    Brief Operative Note    Pre-operative diagnosis: Kidney stone [N20.0]  Post-operative diagnosis Same as pre-operative diagnosis    Procedure: NEPHROLITHOTOMY, PERCUTANEOUS, USING HOLMIUM LASER AND  bilateral ureteral stent exchange, Bilateral - Flank    Surgeon: Surgeons and Role:     * Robbie Ohara MD - Primary     * Riki Coronado MD - Resident - Assisting     * Raul Arthur MD - Fellow - Assisting  Anesthesia: General   Estimated Blood Loss: Less than 100 ml    Drains:  Bilateral 7fr x 22cm stents, 16fr conde with 10cc in the balloon  Specimens:   ID Type Source Tests Collected by Time Destination   A : LEFT AND RIGHT KIDNEY STONES FOR CULTURE Calculus/Stone Kidney, Left AEROBIC BACTERIAL CULTURE ROUTINE Robbie Ohara MD 9/12/2024  2:15 PM    B : LEFT AND RIGHT KIDNEY STONES FOR ANALYSIS Calculus/Stone Kidney, Left STONE ANALYSIS Robbie Ohara MD 9/12/2024  2:15 PM      Findings:   Large stone burden both upper tracts. Visually clear of stone at end of case. Bilateral 7fr 22cm stents placed .  Complications: None.  Implants:   Implant Name Type Inv. Item Serial No.  Lot No. LRB No. Used Action   STENT URETERAL PERCUFLEX PLUS [id number] - [id number] Stent STENT URETERAL PERCUFLEX PLUS [id number]  [employer] [id number] Left 1 Implanted   STENT URETERAL PERCUFLEX PLUS [id number] - [id number] Stent STENT URETERAL PERCUFLEX PLUS [id number]  [employer] [id number] Left 1 Explanted   STENT URETERAL PERCUFLEX PLUS [id number] - [id number] Stent STENT URETERAL PERCUFLEX PLUS [id number]  [employer] [id number] Right 1 Explanted   STENT URETERAL PERCUFLEX PLUS [id number] - [id number] Stent STENT URETERAL PERCUFLEX PLUS [id number]  [employer] [id number] Right 1 Implanted         PLAN  Admit for monitoring  PACU labs  CT in am  CTX while in-house

## 2024-09-12 NOTE — ANESTHESIA PROCEDURE NOTES
Airway       Patient location during procedure: OR       Procedure Start/Stop Times: 9/12/2024 11:51 AM  Staff -        Anesthesiologist:  Carmen Leo MD       Resident/Fellow: Yves Humphrey MD       Performed By: residentIndications and Patient Condition       Indications for airway management: federico-procedural       Induction type:intravenous       Mask difficulty assessment: 1 - vent by mask    Final Airway Details       Final airway type: endotracheal airway       Successful airway: ETT - single  Endotracheal Airway Details        ETT size (mm): 6.5       Cuffed: yes       Successful intubation technique: direct laryngoscopy       DL Blade Type: MAC 3       Grade View of Cords: 1       Adjucts: stylet       Position: Right       Measured from: gums/teeth       Secured at (cm): 21       Bite block used: None    Post intubation assessment        Placement verified by: capnometry, equal breath sounds and chest rise        Number of attempts at approach: 1       Secured with: tape       Ease of procedure: easy       Dentition: Intact    Medication(s) Administered   Medication Administration Time: 9/12/2024 11:51 AM

## 2024-09-13 ENCOUNTER — APPOINTMENT (OUTPATIENT)
Dept: CT IMAGING | Facility: CLINIC | Age: 64
End: 2024-09-13
Payer: COMMERCIAL

## 2024-09-13 VITALS
HEART RATE: 81 BPM | RESPIRATION RATE: 20 BRPM | BODY MASS INDEX: 24.16 KG/M2 | HEIGHT: 57 IN | SYSTOLIC BLOOD PRESSURE: 104 MMHG | TEMPERATURE: 98.1 F | WEIGHT: 111.99 LBS | DIASTOLIC BLOOD PRESSURE: 60 MMHG | OXYGEN SATURATION: 95 %

## 2024-09-13 DIAGNOSIS — N20.0 KIDNEY STONE: Primary | ICD-10-CM

## 2024-09-13 LAB
ANION GAP SERPL CALCULATED.3IONS-SCNC: 10 MMOL/L (ref 7–15)
BUN SERPL-MCNC: 25.9 MG/DL (ref 8–23)
CALCIUM SERPL-MCNC: 8.4 MG/DL (ref 8.8–10.4)
CHLORIDE SERPL-SCNC: 104 MMOL/L (ref 98–107)
CREAT SERPL-MCNC: 2.18 MG/DL (ref 0.51–0.95)
EGFRCR SERPLBLD CKD-EPI 2021: 25 ML/MIN/1.73M2
ERYTHROCYTE [DISTWIDTH] IN BLOOD BY AUTOMATED COUNT: 12.4 % (ref 10–15)
GLUCOSE SERPL-MCNC: 106 MG/DL (ref 70–99)
HCO3 SERPL-SCNC: 20 MMOL/L (ref 22–29)
HCT VFR BLD AUTO: 30 % (ref 35–47)
HGB BLD-MCNC: 10.3 G/DL (ref 11.7–15.7)
MCH RBC QN AUTO: 30 PG (ref 26.5–33)
MCHC RBC AUTO-ENTMCNC: 34.3 G/DL (ref 31.5–36.5)
MCV RBC AUTO: 88 FL (ref 78–100)
PLATELET # BLD AUTO: 192 10E3/UL (ref 150–450)
POTASSIUM SERPL-SCNC: 3.9 MMOL/L (ref 3.4–5.3)
RADIOLOGIST FLAGS: ABNORMAL
RBC # BLD AUTO: 3.43 10E6/UL (ref 3.8–5.2)
SODIUM SERPL-SCNC: 134 MMOL/L (ref 135–145)
WBC # BLD AUTO: 12.8 10E3/UL (ref 4–11)

## 2024-09-13 PROCEDURE — 250N000011 HC RX IP 250 OP 636: Performed by: UROLOGY

## 2024-09-13 PROCEDURE — 74176 CT ABD & PELVIS W/O CONTRAST: CPT | Mod: 26 | Performed by: RADIOLOGY

## 2024-09-13 PROCEDURE — 36415 COLL VENOUS BLD VENIPUNCTURE: CPT | Performed by: UROLOGY

## 2024-09-13 PROCEDURE — 74176 CT ABD & PELVIS W/O CONTRAST: CPT

## 2024-09-13 PROCEDURE — 85014 HEMATOCRIT: CPT | Performed by: UROLOGY

## 2024-09-13 PROCEDURE — 82374 ASSAY BLOOD CARBON DIOXIDE: CPT | Performed by: UROLOGY

## 2024-09-13 PROCEDURE — 258N000003 HC RX IP 258 OP 636

## 2024-09-13 PROCEDURE — 250N000013 HC RX MED GY IP 250 OP 250 PS 637: Performed by: UROLOGY

## 2024-09-13 RX ORDER — ACETAMINOPHEN 325 MG/1
975 TABLET ORAL EVERY 8 HOURS
Qty: 100 TABLET | Refills: 0 | Status: SHIPPED | OUTPATIENT
Start: 2024-09-13

## 2024-09-13 RX ORDER — CEPHALEXIN 500 MG/1
500 CAPSULE ORAL 2 TIMES DAILY
Qty: 10 CAPSULE | Refills: 0 | Status: SHIPPED | OUTPATIENT
Start: 2024-09-13 | End: 2024-10-02

## 2024-09-13 RX ORDER — OXYCODONE HYDROCHLORIDE 5 MG/1
5 TABLET ORAL EVERY 6 HOURS PRN
Qty: 12 TABLET | Refills: 0 | Status: SHIPPED | OUTPATIENT
Start: 2024-09-13 | End: 2024-09-16

## 2024-09-13 RX ADMIN — ACETAMINOPHEN 975 MG: 325 TABLET ORAL at 12:05

## 2024-09-13 RX ADMIN — ACETAMINOPHEN 975 MG: 325 TABLET ORAL at 01:34

## 2024-09-13 RX ADMIN — SENNOSIDES AND DOCUSATE SODIUM 1 TABLET: 50; 8.6 TABLET ORAL at 09:05

## 2024-09-13 RX ADMIN — OXYCODONE HYDROCHLORIDE 5 MG: 5 TABLET ORAL at 01:39

## 2024-09-13 RX ADMIN — TAMSULOSIN HYDROCHLORIDE 0.4 MG: 0.4 CAPSULE ORAL at 09:05

## 2024-09-13 RX ADMIN — SODIUM CHLORIDE: 9 INJECTION, SOLUTION INTRAVENOUS at 06:34

## 2024-09-13 RX ADMIN — OXYCODONE HYDROCHLORIDE 5 MG: 5 TABLET ORAL at 09:05

## 2024-09-13 RX ADMIN — TOLTERODINE 2 MG: 2 CAPSULE, EXTENDED RELEASE ORAL at 09:05

## 2024-09-13 RX ADMIN — CEFTRIAXONE SODIUM 1 G: 1 INJECTION, POWDER, FOR SOLUTION INTRAMUSCULAR; INTRAVENOUS at 12:05

## 2024-09-13 RX ADMIN — ALLOPURINOL 200 MG: 100 TABLET ORAL at 09:05

## 2024-09-13 RX ADMIN — POLYETHYLENE GLYCOL 3350 17 G: 17 POWDER, FOR SOLUTION ORAL at 09:05

## 2024-09-13 ASSESSMENT — ACTIVITIES OF DAILY LIVING (ADL)
ADLS_ACUITY_SCORE: 20
ADLS_ACUITY_SCORE: 20
ADLS_ACUITY_SCORE: 21
ADLS_ACUITY_SCORE: 20
ADLS_ACUITY_SCORE: 20
ADLS_ACUITY_SCORE: 21
ADLS_ACUITY_SCORE: 20
ADLS_ACUITY_SCORE: 21
ADLS_ACUITY_SCORE: 20

## 2024-09-13 NOTE — PLAN OF CARE
Goal Outcome Evaluation:    Plan of Care Reviewed with: patient, daughter    Overall Patient Progress: improving    Outcome Evaluation:     Neuro/CMS: A&Ox4. Hmong  needed.   Resp/Cardiac: Denies chest pain, SOB, dizziness.   GI/: Continent of bowel. Last BM 9/11. Ackerman in place draining adequate amounts of red urine.   Skin: Has left and right lower back incisions. Dressings C/D/I.  Activity: Did not get OOB overnight.   Pain: PRN oxycodone and scheduled tylenol.   LDA: Right PIV infusing NS at 75 ml/hr.   Other: Family member approved to stay overnight. Assisted with interpretation.     Plan: Potential TOV and discharge today.

## 2024-09-13 NOTE — UTILIZATION REVIEW
Admission Status; Secondary Review Determination       Under the authority of the Utilization Management Committee, the utilization review process indicated a secondary review on the above patient. The review outcome is based on review of the medical records, discussions with staff, and applying clinical experience noted on the date of the review.     (x) Outpatient Status with extended recovery is appropriate - This patient does not meet hospital inpatient criteria. If this patient's primary payer is Medicare and was admitted as an inpatient, Condition Code 44 should be used and patient status changed to outpatient recovery.    RATIONALE FOR DETERMINATION   64 years old  man who underwent a Placement of ureteral stents, Retrograde Pyelograms, Percutaneous access to the bilateral kidneys, Dilation of renal access tracts, Percutaneous nephrolithotomy for stones, Antegrade ureteroscopy. Patient was admitted to the hospital after the procedure. Patient has Medicare and the procedure is not on the CMS inpatient list. No documented complications or unexpected recovery. Patient can be safely  monitored for bleeding and recover in outpatient/extended recovery setting. The severity of illness, intensity of service provided, expected LOS and risk for adverse outcome doesn't meet inpatient hospital admission. Dr. Coronado notified.    This document was produced using voice recognition software.     The information on this document is developed by the utilization review team in order for the business office to ensure compliance. This only denotes the appropriateness of proper admission status and does not reflect the quality of care rendered.   The definitions of Inpatient Status and Observation Status used in making the determination above are those provided in the CMS Coverage Manual, Chapter 1 and Chapter 6, section 70.4.   Sincerely,   Valeriano Mims DO  Physician Advisor  Utilization Management   [facility]  Services.

## 2024-09-13 NOTE — PROGRESS NOTES
"Urology  Progress Note    The author of this note may not be on call. To avoid delays in patient care please use AMCOM to find correct on call person    24 hour events/Subjective:     - No acute events overnight   - Pain sub optimally controlled   - Tolerating regular diet ; mild nausea, single small volume emesis.   - Passing flatus, no bowel movement   - Up to chair yesterday      Exam  /60 (BP Location: Left arm, Patient Position: Semi-Nur's, Cuff Size: Adult Regular)   Pulse 81   Temp 98.1  F (36.7  C) (Oral)   Resp 20   Ht 1.448 m (4' 9\")   Wt 50.8 kg (111 lb 15.9 oz)   SpO2 95%   BMI 24.24 kg/m    No acute distress  Unlabored breathing on RA  Abdomen soft, appropriately tender, nondistended. Incisions cdi  conde draining clear yellow urine      Intake/Output Summary (Last 24 hours) at 9/13/2024 0850  Last data filed at 9/13/2024 0611  Gross per 24 hour   Intake 860 ml   Output 890 ml   Net -30 ml       +NR    Labs  Recent Labs   Lab Test 09/13/24  0739 09/12/24  1603 09/12/24  1000 08/15/24  0646 08/14/24  1342   WBC 12.8* 6.2  --   --  8.5   HGB 10.3* 12.5  --   --  12.2   CR 2.18* 1.82* 1.86*   < > 4.48*    < > = values in this interval not displayed.        AM labs pending    7-Day Micro Results       Collected Updated Procedure Result Status      09/12/2024 1415 09/12/2024 1503 Calculus/Stone Aerobic Bacterial Culture Routine [[id number]]   Calculus/Stone from Kidney, Left    In process Component Value   No component results                         Assessment/Plan  64 year old y/o female POD#1 s/p bilateral PCNL    Mild elevation in Cr 2.2 from 1.8 yesterday. Unclear baseline but was 2.4 in August preop    Pending CT scan this am to eval for residual stone    Note - plan changes for today are in bold below.    Neuro: pain control: PRN oxycodone 5-10 mg q3h , PRN dilaudid 0.2 - 0.4 mg q2h, and scheduled tylenol   CV: HDS   Pulm: incentive spirometry while awake  FEN/GI: reg diet, " "MIVF @ 75/hr  Endo: relatively euglycemic   : TOV this am. Bilateral stents to remain in place until followup  Heme/ID: monitor for s/s of infection; monitor Hb and transfuse as indicated. On CTX while inhouse  Activity: Up as tolerated  Ambulate at least TID   Ppx: SCDs, ambulation  Home RX on HOLD: na  Dispo: likely home pending pain control and am CT scan.      Seen and examined with the chief resident. Will discuss with Robbie Ohara MD.    Riki Coronado MD, PGY-2  Urology Resident      PTA medications:   Prior to Admission medications    Medication Sig Start Date End Date Taking? Authorizing Provider   acetaminophen (TYLENOL) 325 MG tablet Take 2 tablets (650 mg) by mouth every 4 hours as needed for mild pain or other (and adjunct with moderate or severe pain or per patient request) 8/17/24  Yes Angela Headley MD   allopurinol (ZYLOPRIM) 100 MG tablet take 100mg by mouth once daily for 2 weeks; then increase to 200mg for 2 weeks; then increase to 300mg 8/27/24  Yes Reported, Patient   cephALEXin (KEFLEX) 500 MG capsule Take 1 capsule (500 mg) by mouth 2 times daily. 9/9/24  Yes Robbie Ohara MD   triamcinolone (KENALOG) 0.1 % external cream Apply topically 3 times daily. 8/27/24  Yes Reported, Patient          Contacting the urology team: Please see [facility] and page on-call clinician with any questions or concerns regarding this patient. Note writer may be unavailable.     To access [employer] from intranet: under \"Applications\" --> \"Business Applications\" select \"[employer]\" and search \"Urology [facility].\" Please note that any question about a urology inpatient, [place] or [place], should go to job code [id number].     "

## 2024-09-13 NOTE — DISCHARGE SUMMARY
Physician Discharge Summary     Patient ID:  Zoraida Marie  [id number]  64 year old  1960    Admit date: 9/12/2024    Discharge date and time: 9/13/2024     Admitting Physician: Dr Mayda WATKINS    Discharge Physician: Elvin Coronado MD    Admission Diagnoses: Kidney stone [N20.0]    Discharge Diagnoses: same    Admission Condition: good    Discharged Condition: good    Indication for Admission: post op cares    Hospital Course:   The patient's hospital course was unremarkable.  Zoraida Marie recovered as anticipated and experienced no post-operative complications.      On POD#1 patient was ambulating without assitance, tolerating the discharge diet, had pain controlled with PO medications to go home with, and requiring no IV medications or fluids. Patient was discharged home with appropriate contact information, follow-up and instructions as seen below in the discharge paperwork.    Discharge Exam:  No acute distress  Unlabored breathing on RA  Abdomen soft, appropriately tender, nondistended. Incisions cdi    Disposition: home    Patient Instructions:   Current Discharge Medication List        START taking these medications    Details   !! acetaminophen (TYLENOL) 325 MG tablet Take 3 tablets (975 mg) by mouth every 8 hours.  Qty: 100 tablet, Refills: 0    Associated Diagnoses: Renal stone; Ureteral stone      oxyCODONE (ROXICODONE) 5 MG tablet Take 1 tablet (5 mg) by mouth every 6 hours as needed for pain.  Qty: 12 tablet, Refills: 0    Associated Diagnoses: Renal stone       !! - Potential duplicate medications found. Please discuss with provider.        CONTINUE these medications which have NOT CHANGED    Details   !! acetaminophen (TYLENOL) 325 MG tablet Take 2 tablets (650 mg) by mouth every 4 hours as needed for mild pain or other (and adjunct with moderate or severe pain or per patient request)    Associated Diagnoses: Ureteral stone      allopurinol (ZYLOPRIM) 100 MG tablet take 100mg by mouth once daily for 2 weeks; then  increase to 200mg for 2 weeks; then increase to 300mg      cephALEXin (KEFLEX) 500 MG capsule Take 1 capsule (500 mg) by mouth 2 times daily.  Qty: 6 capsule, Refills: 0    Associated Diagnoses: Ureteral stone      triamcinolone (KENALOG) 0.1 % external cream Apply topically 3 times daily.       !! - Potential duplicate medications found. Please discuss with provider.            Follow-up with Dr Ohara for followup procedures    Signed:  Riki Coronado MD  9/13/2024  1:04 PM

## 2024-09-13 NOTE — PLAN OF CARE
Goal Outcome Evaluation:      Plan of Care Reviewed With: patient    Overall Patient Progress: improvingOverall Patient Progress: improving    Outcome Evaluation: Plan for pt to be discharged this afternoon. Pain well controlled, no nausea. Family present to receive discharge instructions with pt.

## 2024-09-16 ENCOUNTER — TELEPHONE (OUTPATIENT)
Dept: UROLOGY | Facility: CLINIC | Age: 64
End: 2024-09-16
Payer: COMMERCIAL

## 2024-09-16 PROBLEM — N20.0 KIDNEY STONE: Status: ACTIVE | Noted: 2024-09-13

## 2024-09-16 NOTE — TELEPHONE ENCOUNTER
Yany NEWSOME    Spoke with:Barbara BOURNE MA Spoke to patient in [facility] to inform her       Date of surgery: Friday Oct 4 2024 with Dr Ohara      Location: [facility]      Informed patient they will need a adult : YES      Pre op with provider: Recent OR      H&P Scheduled in PAC- NA        Pre procedure covid : Not req      Additional imaging: NA        Surgery Packet : mailed to patient       Additional comments: Please call for surgery teaching

## 2024-09-16 NOTE — TELEPHONE ENCOUNTER
Spoke to patient's daughter Calixto regarding surgery date of 10/4/24 at [facility] for 2nd look with left URS.  Daughter is ok with date.  She will wait for surgery packet in the mail.  Surgery prep education was reviewed with Sloane Robb to call office should patient have any fevers or concerns with her bilateral stents before surgery date.

## 2024-09-17 LAB
APPEARANCE STONE: NORMAL
BACTERIA SPEC CULT: NO GROWTH
COMPN STONE: NORMAL
SPECIMEN WT: 313 MG

## 2024-10-02 ENCOUNTER — APPOINTMENT (OUTPATIENT)
Dept: INTERPRETER SERVICES | Facility: CLINIC | Age: 64
End: 2024-10-02
Payer: COMMERCIAL

## 2024-10-02 RX ORDER — ALLOPURINOL 300 MG/1
300 TABLET ORAL DAILY
COMMUNITY
Start: 2024-09-24

## 2024-10-03 ENCOUNTER — ANESTHESIA EVENT (OUTPATIENT)
Dept: SURGERY | Facility: AMBULATORY SURGERY CENTER | Age: 64
End: 2024-10-03
Payer: COMMERCIAL

## 2024-10-04 ENCOUNTER — ANESTHESIA (OUTPATIENT)
Dept: SURGERY | Facility: AMBULATORY SURGERY CENTER | Age: 64
End: 2024-10-04
Payer: COMMERCIAL

## 2024-10-04 ENCOUNTER — HOSPITAL ENCOUNTER (OUTPATIENT)
Facility: AMBULATORY SURGERY CENTER | Age: 64
Discharge: HOME | End: 2024-10-04
Attending: UROLOGY
Payer: COMMERCIAL

## 2024-10-04 VITALS
HEART RATE: 70 BPM | BODY MASS INDEX: 24.16 KG/M2 | RESPIRATION RATE: 16 BRPM | DIASTOLIC BLOOD PRESSURE: 70 MMHG | OXYGEN SATURATION: 96 % | SYSTOLIC BLOOD PRESSURE: 123 MMHG | HEIGHT: 57 IN | TEMPERATURE: 97.1 F | WEIGHT: 111.99 LBS

## 2024-10-04 DIAGNOSIS — N20.0 KIDNEY STONE: ICD-10-CM

## 2024-10-04 DIAGNOSIS — N20.0 RENAL STONE: Primary | ICD-10-CM

## 2024-10-04 PROCEDURE — 82365 CALCULUS SPECTROSCOPY: CPT | Mod: 90 | Performed by: INTERNAL MEDICINE

## 2024-10-04 PROCEDURE — 74420 UROGRAPHY RTRGR +-KUB: CPT | Mod: 26 | Performed by: UROLOGY

## 2024-10-04 PROCEDURE — 52356 CYSTO/URETERO W/LITHOTRIPSY: CPT | Mod: 58 | Performed by: UROLOGY

## 2024-10-04 PROCEDURE — 99000 SPECIMEN HANDLING OFFICE-LAB: CPT | Performed by: INTERNAL MEDICINE

## 2024-10-04 PROCEDURE — 52352 CYSTOURETERO W/STONE REMOVE: CPT | Mod: 58 | Performed by: UROLOGY

## 2024-10-04 DEVICE — IMPLANTABLE DEVICE
Type: IMPLANTABLE DEVICE | Site: URETER | Status: NON-FUNCTIONAL
Removed: 2024-10-14

## 2024-10-04 RX ORDER — FENTANYL CITRATE 50 UG/ML
INJECTION, SOLUTION INTRAMUSCULAR; INTRAVENOUS PRN
Status: DISCONTINUED | OUTPATIENT
Start: 2024-10-04 | End: 2024-10-04

## 2024-10-04 RX ORDER — FENTANYL CITRATE 0.05 MG/ML
50 INJECTION, SOLUTION INTRAMUSCULAR; INTRAVENOUS EVERY 5 MIN PRN
Status: DISCONTINUED | OUTPATIENT
Start: 2024-10-04 | End: 2024-10-05 | Stop reason: HOSPADM

## 2024-10-04 RX ORDER — ACETAMINOPHEN 650 MG/1
650 SUPPOSITORY RECTAL ONCE
Status: DISCONTINUED | OUTPATIENT
Start: 2024-10-04 | End: 2024-10-04

## 2024-10-04 RX ORDER — EPHEDRINE SULFATE 50 MG/ML
INJECTION, SOLUTION INTRAMUSCULAR; INTRAVENOUS; SUBCUTANEOUS PRN
Status: DISCONTINUED | OUTPATIENT
Start: 2024-10-04 | End: 2024-10-04

## 2024-10-04 RX ORDER — ACETAMINOPHEN 325 MG/1
975 TABLET ORAL ONCE
Status: DISCONTINUED | OUTPATIENT
Start: 2024-10-04 | End: 2024-10-04

## 2024-10-04 RX ORDER — OXYCODONE HYDROCHLORIDE 5 MG/1
5 TABLET ORAL
Status: DISCONTINUED | OUTPATIENT
Start: 2024-10-04 | End: 2024-10-05 | Stop reason: HOSPADM

## 2024-10-04 RX ORDER — ONDANSETRON 2 MG/ML
INJECTION INTRAMUSCULAR; INTRAVENOUS PRN
Status: DISCONTINUED | OUTPATIENT
Start: 2024-10-04 | End: 2024-10-04

## 2024-10-04 RX ORDER — DEXAMETHASONE SODIUM PHOSPHATE 4 MG/ML
4 INJECTION, SOLUTION INTRA-ARTICULAR; INTRALESIONAL; INTRAMUSCULAR; INTRAVENOUS; SOFT TISSUE
Status: DISCONTINUED | OUTPATIENT
Start: 2024-10-04 | End: 2024-10-05 | Stop reason: HOSPADM

## 2024-10-04 RX ORDER — ACETAMINOPHEN 325 MG/1
975 TABLET ORAL ONCE
Status: COMPLETED | OUTPATIENT
Start: 2024-10-04 | End: 2024-10-04

## 2024-10-04 RX ORDER — TAMSULOSIN HYDROCHLORIDE 0.4 MG/1
0.4 CAPSULE ORAL DAILY
Qty: 14 CAPSULE | Refills: 0 | Status: SHIPPED | OUTPATIENT
Start: 2024-10-04 | End: 2024-10-14

## 2024-10-04 RX ORDER — CEFAZOLIN SODIUM 2 G/100ML
2 INJECTION, SOLUTION INTRAVENOUS
Status: COMPLETED | OUTPATIENT
Start: 2024-10-04 | End: 2024-10-04

## 2024-10-04 RX ORDER — NALOXONE HYDROCHLORIDE 0.4 MG/ML
0.1 INJECTION, SOLUTION INTRAMUSCULAR; INTRAVENOUS; SUBCUTANEOUS
Status: DISCONTINUED | OUTPATIENT
Start: 2024-10-04 | End: 2024-10-05 | Stop reason: HOSPADM

## 2024-10-04 RX ORDER — SODIUM CHLORIDE, SODIUM LACTATE, POTASSIUM CHLORIDE, CALCIUM CHLORIDE 600; 310; 30; 20 MG/100ML; MG/100ML; MG/100ML; MG/100ML
INJECTION, SOLUTION INTRAVENOUS CONTINUOUS
Status: DISCONTINUED | OUTPATIENT
Start: 2024-10-04 | End: 2024-10-05 | Stop reason: HOSPADM

## 2024-10-04 RX ORDER — LIDOCAINE HYDROCHLORIDE 20 MG/ML
INJECTION, SOLUTION INFILTRATION; PERINEURAL PRN
Status: DISCONTINUED | OUTPATIENT
Start: 2024-10-04 | End: 2024-10-04

## 2024-10-04 RX ORDER — PROPOFOL 10 MG/ML
INJECTION, EMULSION INTRAVENOUS CONTINUOUS PRN
Status: DISCONTINUED | OUTPATIENT
Start: 2024-10-04 | End: 2024-10-04

## 2024-10-04 RX ORDER — HYDROMORPHONE HCL IN WATER/PF 6 MG/30 ML
0.4 PATIENT CONTROLLED ANALGESIA SYRINGE INTRAVENOUS EVERY 5 MIN PRN
Status: DISCONTINUED | OUTPATIENT
Start: 2024-10-04 | End: 2024-10-05 | Stop reason: HOSPADM

## 2024-10-04 RX ORDER — ONDANSETRON 4 MG/1
4 TABLET, ORALLY DISINTEGRATING ORAL EVERY 30 MIN PRN
Status: DISCONTINUED | OUTPATIENT
Start: 2024-10-04 | End: 2024-10-05 | Stop reason: HOSPADM

## 2024-10-04 RX ORDER — FENTANYL CITRATE 0.05 MG/ML
25 INJECTION, SOLUTION INTRAMUSCULAR; INTRAVENOUS EVERY 5 MIN PRN
Status: DISCONTINUED | OUTPATIENT
Start: 2024-10-04 | End: 2024-10-05 | Stop reason: HOSPADM

## 2024-10-04 RX ORDER — HYDROMORPHONE HCL IN WATER/PF 6 MG/30 ML
0.2 PATIENT CONTROLLED ANALGESIA SYRINGE INTRAVENOUS EVERY 5 MIN PRN
Status: DISCONTINUED | OUTPATIENT
Start: 2024-10-04 | End: 2024-10-05 | Stop reason: HOSPADM

## 2024-10-04 RX ORDER — DEXAMETHASONE SODIUM PHOSPHATE 4 MG/ML
INJECTION, SOLUTION INTRA-ARTICULAR; INTRALESIONAL; INTRAMUSCULAR; INTRAVENOUS; SOFT TISSUE PRN
Status: DISCONTINUED | OUTPATIENT
Start: 2024-10-04 | End: 2024-10-04

## 2024-10-04 RX ORDER — ONDANSETRON 2 MG/ML
4 INJECTION INTRAMUSCULAR; INTRAVENOUS EVERY 30 MIN PRN
Status: DISCONTINUED | OUTPATIENT
Start: 2024-10-04 | End: 2024-10-05 | Stop reason: HOSPADM

## 2024-10-04 RX ORDER — FENTANYL CITRATE 0.05 MG/ML
25 INJECTION, SOLUTION INTRAMUSCULAR; INTRAVENOUS
Status: DISCONTINUED | OUTPATIENT
Start: 2024-10-04 | End: 2024-10-05 | Stop reason: HOSPADM

## 2024-10-04 RX ORDER — LIDOCAINE 40 MG/G
CREAM TOPICAL
Status: DISCONTINUED | OUTPATIENT
Start: 2024-10-04 | End: 2024-10-05 | Stop reason: HOSPADM

## 2024-10-04 RX ORDER — CEFAZOLIN SODIUM 2 G/100ML
2 INJECTION, SOLUTION INTRAVENOUS SEE ADMIN INSTRUCTIONS
Status: DISCONTINUED | OUTPATIENT
Start: 2024-10-04 | End: 2024-10-05 | Stop reason: HOSPADM

## 2024-10-04 RX ORDER — PROPOFOL 10 MG/ML
INJECTION, EMULSION INTRAVENOUS PRN
Status: DISCONTINUED | OUTPATIENT
Start: 2024-10-04 | End: 2024-10-04

## 2024-10-04 RX ADMIN — PROPOFOL 160 MCG/KG/MIN: 10 INJECTION, EMULSION INTRAVENOUS at 10:32

## 2024-10-04 RX ADMIN — PROPOFOL 100 MG: 10 INJECTION, EMULSION INTRAVENOUS at 10:30

## 2024-10-04 RX ADMIN — SODIUM CHLORIDE, SODIUM LACTATE, POTASSIUM CHLORIDE, CALCIUM CHLORIDE: 600; 310; 30; 20 INJECTION, SOLUTION INTRAVENOUS at 09:35

## 2024-10-04 RX ADMIN — ACETAMINOPHEN 975 MG: 325 TABLET ORAL at 09:35

## 2024-10-04 RX ADMIN — LIDOCAINE HYDROCHLORIDE 2 ML: 20 INJECTION, SOLUTION INFILTRATION; PERINEURAL at 10:30

## 2024-10-04 RX ADMIN — CEFAZOLIN SODIUM 2 G: 2 INJECTION, SOLUTION INTRAVENOUS at 10:25

## 2024-10-04 RX ADMIN — EPHEDRINE SULFATE 10 MG: 50 INJECTION, SOLUTION INTRAMUSCULAR; INTRAVENOUS; SUBCUTANEOUS at 10:34

## 2024-10-04 RX ADMIN — Medication 100 MCG: at 10:45

## 2024-10-04 RX ADMIN — FENTANYL CITRATE 50 MCG: 50 INJECTION, SOLUTION INTRAMUSCULAR; INTRAVENOUS at 10:30

## 2024-10-04 RX ADMIN — ONDANSETRON 4 MG: 2 INJECTION INTRAMUSCULAR; INTRAVENOUS at 11:15

## 2024-10-04 RX ADMIN — DEXAMETHASONE SODIUM PHOSPHATE 10 MG: 4 INJECTION, SOLUTION INTRA-ARTICULAR; INTRALESIONAL; INTRAMUSCULAR; INTRAVENOUS; SOFT TISSUE at 10:30

## 2024-10-04 RX ADMIN — EPHEDRINE SULFATE 5 MG: 50 INJECTION, SOLUTION INTRAMUSCULAR; INTRAVENOUS; SUBCUTANEOUS at 10:37

## 2024-10-04 NOTE — ANESTHESIA POSTPROCEDURE EVALUATION
Patient: Zoraida Marie    Procedure: Procedure(s):  Cystoscopy, Bilateral Ureteroscopy, Bilateral Retrograde Pyelogram, Left Laser Lithotripsy,  Bilateral Stone Basket Extraction,  Left Ureteral Stent Exchange,  right ureteral stent removal       Anesthesia Type:  General    Note:  Disposition: Outpatient   Postop Pain Control: Uneventful            Sign Out: Well controlled pain   PONV: No   Neuro/Psych: Uneventful            Sign Out: Acceptable/Baseline neuro status   Airway/Respiratory: Uneventful            Sign Out: Acceptable/Baseline resp. status   CV/Hemodynamics: Uneventful            Sign Out: Acceptable CV status; No obvious hypovolemia; No obvious fluid overload   Other NRE: NONE   DID A NON-ROUTINE EVENT OCCUR? No           Last vitals:  Vitals Value Taken Time   /74 10/04/24 1230   Temp 97.1  F (36.2  C) 10/04/24 1130   Pulse 75 10/04/24 1240   Resp 16 10/04/24 1200   SpO2 94 % 10/04/24 1240   Vitals shown include unfiled device data.    Electronically Signed By: Zechariah Limon MD  October 4, 2024  1:16 PM

## 2024-10-04 NOTE — ANESTHESIA PREPROCEDURE EVALUATION
Anesthesia Pre-Procedure Evaluation    Patient: Zoraida Marie   MRN: [id number] : 1960        Procedure : Procedure(s):  Cystoscopy, Left Ureteroscopy, Left Retrograde Pyelogram, Left Laser Lithotripsy, Possible Left Ureteral Stent Placement, Possible Left Ureteral Stent Removal, Right ureteroscopy, possible right ureteral stent removal          Past Medical History:   Diagnosis Date    Gout     Renal disease       Past Surgical History:   Procedure Laterality Date    CYSTOSCOPY, RETROGRADES, INSERT STENT URETER(S), COMBINED Bilateral 2024    Procedure: CYSTOSCOPY, WITH bilateral RETROGRADE PYELOGRAM AND BILATERAL URETERAL STENT INSERTION;  Surgeon: Juma Thomas MD;  [place] [facility] [place]    LASER HOLMIUM NEPHROLITHOTOMY VIA PERCUTANEOUS NEPHROSTOMY Bilateral 2024    Procedure: NEPHROLITHOTOMY, PERCUTANEOUS, USING HOLMIUM LASER AND  bilateral ureteral stent exchange;  Surgeon: Robbie Ohara MD;  Location: [facility] OR      No Known Allergies   Social History     Tobacco Use    Smoking status: Never    Smokeless tobacco: Never   Substance Use Topics    Alcohol use: Never      Wt Readings from Last 1 Encounters:   10/04/24 50.8 kg (111 lb 15.9 oz)        Anesthesia Evaluation   Pt has had prior anesthetic.     No history of anesthetic complications       ROS/MED HX  ENT/Pulmonary:  - neg pulmonary ROS     Neurologic:  - neg neurologic ROS     Cardiovascular:  - neg cardiovascular ROS     METS/Exercise Tolerance:     Hematologic:     (+)      anemia,          Musculoskeletal:   (+)  arthritis (Gout),             GI/Hepatic:  - neg GI/hepatic ROS     Renal/Genitourinary:     (+) renal disease, type: ARF and CRI,     Nephrolithiasis ,       Endo:  - neg endo ROS     Psychiatric/Substance Use:  - neg psychiatric ROS     Infectious Disease:  - neg infectious disease ROS     Malignancy:  - neg malignancy ROS     Other:  - neg other ROS          Physical Exam    Airway  airway exam normal     "  Mallampati: II   TM distance: > 3 FB   Neck ROM: full   Mouth opening: > 3 cm    Respiratory Devices and Support         Dental  no notable dental history     (+) Modest Abnormalities - crowns, retainers, 1 or 2 missing teeth      Cardiovascular   cardiovascular exam normal       Rhythm and rate: regular and normal     Pulmonary   pulmonary exam normal        breath sounds clear to auscultation           OUTSIDE LABS:  CBC:   Lab Results   Component Value Date    WBC 12.8 (H) 09/13/2024    WBC 6.2 09/12/2024    HGB 10.3 (L) 09/13/2024    HGB 12.5 09/12/2024    HCT 30.0 (L) 09/13/2024    HCT 36.1 09/12/2024     09/13/2024     09/12/2024     BMP:   Lab Results   Component Value Date     (L) 09/13/2024     09/12/2024    POTASSIUM 3.9 09/13/2024    POTASSIUM 4.2 09/12/2024    CHLORIDE 104 09/13/2024    CHLORIDE 106 09/12/2024    CO2 20 (L) 09/13/2024    CO2 20 (L) 09/12/2024    BUN 25.9 (H) 09/13/2024    BUN 24.4 (H) 09/12/2024    CR 2.18 (H) 09/13/2024    CR 1.82 (H) 09/12/2024     (H) 09/13/2024     (H) 09/12/2024     COAGS: No results found for: \"PTT\", \"INR\", \"FIBR\"  POC: No results found for: \"BGM\", \"HCG\", \"HCGS\"  HEPATIC:   Lab Results   Component Value Date    ALBUMIN 4.4 09/12/2024    PROTTOTAL 8.5 (H) 09/12/2024    ALT 21 09/12/2024    AST 30 09/12/2024    ALKPHOS 72 09/12/2024    BILITOTAL 0.5 09/12/2024     OTHER:   Lab Results   Component Value Date    RAFFAELE 8.4 (L) 09/13/2024       Anesthesia Plan    ASA Status:  2    NPO Status:  NPO Appropriate    Anesthesia Type: General.     - Airway: LMA   Induction: Intravenous, Propofol.   Maintenance: Balanced.        Consents    Anesthesia Plan(s) and associated risks, benefits, and realistic alternatives discussed. Questions answered and patient/representative(s) expressed understanding.     - Discussed:     - Discussed with:  Patient            Postoperative Care    Pain management: IV analgesics, Oral pain medications, " Multi-modal analgesia.   PONV prophylaxis: Ondansetron (or other 5HT-3), Dexamethasone or Solumedrol, Droperidol or Haldol     Comments:               Zechariah Limon MD    I have reviewed the pertinent notes and labs in the chart from the past 30 days and (re)examined the patient.  Any updates or changes from those notes are reflected in this note.

## 2024-10-04 NOTE — DISCHARGE INSTRUCTIONS
If you have any questions or concerns regarding your procedure, please contact Dr. Ohara, his office number is [phone].    ______________________________________________________________________    You have received 975 mg of Acetaminophen (Tylenol) at 9:35 AM. Please do not take an additional dose of Tylenol until after 3:35 PM     Do not exceed 4,000 mg of acetaminophen during a 24 hour period and keep in mind that acetaminophen can also be found in many over-the-counter cold medications as well as narcotics that may be given for pain.     ______________________________________________________________________      Going Home With a Ureteral Stent    What is it?  A stent is a soft, plastic tube that helps urine (pee) drain into the bladder.  During the surgery, it is placed in the ureter the tube that connects the kidney to the bladder.  A thin curl at each end of the stent keeps one end in your kidney and the other in your bladder.  The stent can not be seen from outside of the body.    Why Do I Have It?  Some sort of blockage is not letting pee drain into your bladder.  This could be from a stone, certain surgeries or kidney infection.    What Should I Expect?   Stents often cause some discomfort.  You may have:    The need to pee suddenly    Pain when you pee    A dull backache, which may get worse when you pee  Blood in your pee (color of fruit punch) and some clots, which may increase with physical activity.     What Can I Do To Feel Better?    Drink a little more fluids than usual.  You can eat your normal diet.    Enjoy a warm bath.  Decrease your activity.  Some people find bending or twisting movement cause discomfort or increased blood in the urine.  Even so, you will not harm yourself.    ______________________________________________________________________    Adult Discharge Instructions     For 24 hours after surgery    Get plenty of rest.  A responsible adult must stay with you for at least 24  hours after you leave the hospital.     Do not drive or use heavy equipment.  If you have weakness or tingling, don't drive or use heavy equipment until this feeling goes away.    Do not drink alcohol.    Avoid strenuous or risky activities.  Ask for help when climbing stairs.     You may feel lightheaded.  If so, sit for a few minutes before standing.  Have someone help you get up.     You may have a slight fever. Call the doctor if your fever is over 100 F (37.7 C) (taken under the tongue) or lasts longer than 24 hours.    You may have a dry mouth, a sore throat, muscle aches or trouble sleeping.  These should go away after 24 hours.    Do not make important or legal decisions.    Based on the surgery/procedure that you had today, we do not expect that you will have any problems.  However, we want you to know what to do if you have pain, nausea, bleeding, or infection:    To control pain:  Take medicines your physician has prescribed or or over-the counter medicine he or she advises.  Ice packs and periods of rest are often helpful.  For surgery on an arm or leg, raise it on a pillow to ease swelling.  If your pain is not managed with the above methods, contact your physician.    To control nausea:  Take anti-nausea medicine approved by your physician.  Drink clear liquids such as apple juice, ginger ale, broth or 7-Up. Be sure to drink enough fluids.  Move to a regular diet as you feel able.  Rest may also help.    Bleeding:  You may see a little blood on your dressing, about the size of a quarter in the first 24 hours.  If you see this, there is no reason to be alarmed.  However, if this continues to increase in size, apply pressure if able, and notify your physician.    Infection: Please contact your physician if you have any of the following signs:  redness, swelling, heat, increasing pain or foul-smelling drainage at your surgery site, fever or chills.    Call your doctor for any of the followin.  It  has been over 8 to 10 hours since surgery and you are still not able to urinate (pass water).    2.  Headache for over 24 hours.    3.  Numbness, tingling or weakness in your legs the day after surgery (if you had spinal anesthesia).

## 2024-10-04 NOTE — ANESTHESIA CARE TRANSFER NOTE
Patient: Zoraida Marie    Procedure: Procedure(s):  Cystoscopy, Bilateral Ureteroscopy, Bilateral Retrograde Pyelogram, Left Laser Lithotripsy,  Bilateral Stone Basket Extraction,  Left Ureteral Stent Exchange,  right ureteral stent removal       Diagnosis: Kidney stone [N20.0]  Diagnosis Additional Information: No value filed.    Anesthesia Type:   General     Note:    Oropharynx: oropharynx clear of all foreign objects and spontaneously breathing  Level of Consciousness: drowsy  Oxygen Supplementation: face mask  Level of Supplemental Oxygen (L/min / FiO2): 6  Independent Airway: airway patency satisfactory and stable  Dentition: dentition unchanged  Vital Signs Stable: post-procedure vital signs reviewed and stable  Report to RN Given: handoff report given  Patient transferred to: PACU    Handoff Report: Identifed the Patient, Identified the Reponsible Provider, Reviewed the pertinent medical history, Discussed the surgical course, Reviewed Intra-OP anesthesia mangement and issues during anesthesia, Set expectations for post-procedure period and Allowed opportunity for questions and acknowledgement of understanding      Vitals:  Vitals Value Taken Time   /71 10/04/24 1121   Temp 96.6  F (35.9  C) 10/04/24 1121   Pulse 71 10/04/24 1121   Resp 14 10/04/24 1121   SpO2 100 % 10/04/24 1121       Electronically Signed By: RALF Alegre CRNA  October 4, 2024  11:24 AM

## 2024-10-04 NOTE — ANESTHESIA PROCEDURE NOTES
Airway       Patient location during procedure: OR  Staff -        Anesthesiologist:  Zechariah Limon MD       CRNA: Rica Licnoln APRN CRNA       Performed By: CRNAIndications and Patient Condition       Indications for airway management: federico-procedural       Induction type:intravenous       Mask difficulty assessment: 1 - vent by mask    Final Airway Details       Final airway type: supraglottic airway    Supraglottic Airway Details        Type: LMA       LMA size: 3    Post intubation assessment        Placement verified by: capnometry, equal breath sounds and chest rise        Number of attempts at approach: 1       Secured with: tape       Ease of procedure: easy       Dentition: Intact and Unchanged

## 2024-10-04 NOTE — PROGRESS NOTES
I personally met with Zoraida aMrie and discussed their current medical situation.     We reviewed the nature of planned surgery, the risks benefits and complications and treatment alternatives.      Shared decision made to proceed with bilateral ureteroscopic stone treatment    We also reviewed the following financial disclosures potentially applicable to their surgery  I informed the patient that I do have a financial consulting relationship with [employer], a medical device company that makes products which could be used during the procedure  I presented an opportunity to ask questions  and informed them that they were capable of rescinding their consent or not proceeding without penalty if they desire to do so.

## 2024-10-06 LAB
APPEARANCE STONE: NORMAL
COMPN STONE: NORMAL
SPECIMEN WT: 5 MG

## 2024-10-09 ENCOUNTER — TELEPHONE (OUTPATIENT)
Dept: UROLOGY | Facility: CLINIC | Age: 64
End: 2024-10-09
Payer: COMMERCIAL

## 2024-10-09 NOTE — TELEPHONE ENCOUNTER
[facility]    Phone Message    May a detailed message be left on voicemail: yes     Reason for Call: Other: Pt's daughter Calixto would like a call back to schedule follow-up appts after surgery asap. She believes it was suppose to be one week from surgery. Thank you!     Action Taken: Message routed to:  [facility]    Travel Screening: Not Applicable     Date of Service:

## 2024-10-11 NOTE — OP NOTE
PREOPERATIVE DIAGNOSIS:  Bilateral renal stones  POSTOPERATIVE DIAGNOSIS: Same  PROCEDURES PERFORMED:    Cystoscopy  Right ureteroscopy with stone basket extraction  Right retrograde pyelogram with interpretation of imaging  Right ureteral stent removal  Left ureteroscopy with thulium laser lithotripsy and stone basket extraction  Left retrograde pyelogram with interpretation of imaging  Left ureteral stent exchange    STAFF SURGEON: Robbie Ohara MD  RESIDENT(S) none present    ANESTHESIA: General  ESTIMATED BLOOD LOSS: 1 ml  COMPLICATIONS: None  SPECIMEN: Left renal stone for analysis  URETERAL STENT(S): 6 x 24 double-J left ureteral stent    SIGNIFICANT FINDINGS: Small right ureteral stones, large left renal stone    BRIEF OPERATIVE INDICATIONS: Patient presented with bilateral stents and residual stones after percutaneous nephrolithotomy.  After a discussion of all risks, benefits, and alternatives, the patient elected to proceed with definitive stone management. The patient understands the potential need for more than one procedure to eliminate all stone burden.      DESCRIPTION OF PROCEDURE:  After informed consent was obtained, the patient was transported to the operating room & placed supine on the table. After adequate anesthesia was induced, the patient was placed in lithotomy and prepped and draped in the usual sterile fashion. A timeout was taken to confirm correct patient, procedure and laterality. Pre-operative IV antibiotics were administered.     We started the procedure by performing cystoscopy.  We removed both indwelling ureteral stents.  We passed the flexible ureteroscope up the right ureter and identified several small stones in the ureter as well as in the lower pole of the kidney.  We basket extracted them with about 3 passes of the basket.  The remainder of the kidney and ureter were unremarkable.  The ureters were widely accommodating of the scope.  We performed a retrograde pyelogram  showing no extravasation.  We elected not to leave a ureteral stent.  We next turned our attention to the patient's left side.  Again the ureter was widely accommodating and we passed the flexible ureteroscope to the kidney.  Once within the kidney we identified a large greater than 1 cm stone in the midpole calyx.  We used the 200  m thulium laser to perform laser dusting at a setting of 1 J and 10 Hz.  The stone dusted into small debris quite easily.  Once we had treated the entirety of stone we made several passes with the basket to clear any fragments greater than 2 mm in size.  Final retrograde showed no extravasation.   We did elect to leave the prophylactic ureteral stent which we left on a string.    The bladder was drained and the patient was awoken from anesthesia and transported to the postanesthesia care unit in stable condition.     POSTOP PLAN:  -Stent removal via string in 1 week  -Follow-up 2 months with imaging

## 2024-10-14 ENCOUNTER — ALLIED HEALTH/NURSE VISIT (OUTPATIENT)
Dept: UROLOGY | Facility: CLINIC | Age: 64
End: 2024-10-14
Payer: COMMERCIAL

## 2024-10-14 DIAGNOSIS — N20.1 URETERAL STONE: Primary | ICD-10-CM

## 2024-10-14 PROCEDURE — 99211 OFF/OP EST MAY X REQ PHY/QHP: CPT

## 2024-10-14 NOTE — PROGRESS NOTES
Zoraida Marie comes into clinic today at the request of Dr. Ohara for stent on string removal.  She has been tolerating the stent with general symptoms.  Stent removal education discussed with patient and daughter with understanding.      With patient laying on exam bed the stent string was located taped to the left inner thigh.  The stent was pulled out intact without difficulty.  Patient tolerated well.    This service provided today was under the supervising provider of the day Dr. Daniels, who was available if needed.    Barbara Shepherd, CMA

## 2024-11-11 ENCOUNTER — PRE VISIT (OUTPATIENT)
Dept: UROLOGY | Facility: CLINIC | Age: 64
End: 2024-11-11
Payer: COMMERCIAL

## 2024-11-12 NOTE — TELEPHONE ENCOUNTER
Reason for visit: post-op URS     Relevant information: DOS 10/04    Records/imaging/labs/orders: JAMESON scheduled 11/22 at , will be in EPIC    Pt called: No need for a call    At Rooming: vicente Voss RN  11/11/2024  7:16 PM

## 2024-11-26 ENCOUNTER — VIRTUAL VISIT (OUTPATIENT)
Dept: UROLOGY | Facility: CLINIC | Age: 64
End: 2024-11-26
Payer: COMMERCIAL

## 2024-11-26 DIAGNOSIS — N20.1 URETERAL STONE: Primary | ICD-10-CM

## 2024-11-26 PROCEDURE — 99207 PR NO CHARGE LOS: CPT | Mod: 95 | Performed by: UROLOGY

## 2024-11-26 NOTE — LETTER
11/26/2024       RE: Zoraida Marie  [id number] [facility] [address]     Dear Colleague,    Thank you for referring your patient, Zoraida Marie, to the [facility] [place] [facility]. Please see a copy of my visit note below.    Rescheudled as patient did no th ave follow-up imaging to review      Again, thank you for allowing me to participate in the care of your patient.      Sincerely,    Robbie Ohara MD

## 2024-11-26 NOTE — NURSING NOTE
VF unable to room due to Pt answering final call at appt time and needing to get connected to [employer].

## 2024-11-27 ENCOUNTER — TELEPHONE (OUTPATIENT)
Dept: UROLOGY | Facility: CLINIC | Age: 64
End: 2024-11-27
Payer: COMMERCIAL

## 2024-11-27 NOTE — TELEPHONE ENCOUNTER
Attempted to call and reschedule missed appointment with Dr. Ohara as well as imaging using [facility] . Phone rang but was unable to leave voicemail.     Will attempt again another time.     MAYURI Perry  Care Coordinator- Urology   [phone]

## (undated) DEVICE — NDL 25GA 1.5" 305838

## (undated) DEVICE — SUCTION MANIFOLD NEPTUNE 2 SYS 4 PORT 0702-020-000

## (undated) DEVICE — SYR 30ML LL W/O NDL 302832

## (undated) DEVICE — BASKET STONE RETRIEVAL NTNL ZERO TIP 1.9FRX90CM M0063901050

## (undated) DEVICE — URETEROSCOPE FLEX SLG USE STD 7.7FR LITHOVUE M0067913500

## (undated) DEVICE — DRSG KERLIX FLUFFS X5

## (undated) DEVICE — CATH URETERAL OPEN END 5FRX70CM M0064002010

## (undated) DEVICE — DRAPE C-ARM W/STRAPS 42X72" 07-CA104

## (undated) DEVICE — WIRE GUIDE NITINOL BIWIRE 0.035"X150CM G46141

## (undated) DEVICE — PREP POVIDONE-IODINE 7.5% SCRUB 4OZ BOTTLE MDS093945

## (undated) DEVICE — CYSTOSCOPE DISPOSABLE AMBU 601-001-000

## (undated) DEVICE — KIT ENDO FIRST STEP DISINFECTANT 200ML W/POUCH EP-4

## (undated) DEVICE — GLOVE BIOGEL PI ULTRATOUCH G SZ 7.0 42170

## (undated) DEVICE — CONTAINER SPECIMEN 4OZ STERILE 17099

## (undated) DEVICE — TAPE DURAPORE 3" SILK 1538-3

## (undated) DEVICE — LINEN GOWN X4 5410

## (undated) DEVICE — PREP POVIDONE-IODINE 10% SOLUTION 4OZ BOTTLE MDS093944

## (undated) DEVICE — PROBE TRILOGY KIT 3.4MM X 340MM M0068402910

## (undated) DEVICE — LINEN ORTHO PACK 5446

## (undated) DEVICE — ADAPTER SCOPE UROLOK II LF M0067301400

## (undated) DEVICE — SOL WATER IRRIG 1000ML BOTTLE 2F7114

## (undated) DEVICE — DILATOR RENAL AMPLATZ TYPE 8FRX35CM M0062601010

## (undated) DEVICE — TUBING SUCTION MEDI-VAC 1/4"X20' N620A

## (undated) DEVICE — PAD FLOOR SURGSAFE 30X40" 83030

## (undated) DEVICE — SOLUTION IRRIG 2B7127 .9NS 3000ML BAG

## (undated) DEVICE — SU CHROMIC 3-0 SH 27" G122H

## (undated) DEVICE — GLOVE BIOGEL PI MICRO SZ 7.5 48575

## (undated) DEVICE — WIPES FOLEY CARE SURESTEP PROVON DFC100

## (undated) DEVICE — STRAP KNEE/BODY 31143004

## (undated) DEVICE — Device

## (undated) DEVICE — GUIDEWIRE SENSOR DUAL FLEX STR 0.035"X150CM M0066703080

## (undated) DEVICE — DRSG TEGADERM 4X4 3/4" 1626W

## (undated) DEVICE — NDL PERC ACCESS NAVIGUIDE W/SLDER 18GX20CM M0067001330

## (undated) DEVICE — BLADE KNIFE SURG 11 371111

## (undated) DEVICE — LASER FIBER HOLMIUM MOSES 200 D/F/L AC-10030100

## (undated) DEVICE — CATH URETERAL DUAL LUMEN 10FRX54CM M0064051000

## (undated) DEVICE — DRAPE PCNL 41-0021

## (undated) DEVICE — MAT FLOOR SURGICAL 40X38 0702140238

## (undated) DEVICE — SOL NACL 0.9% IRRIG 1000ML BOTTLE 2F7124

## (undated) DEVICE — SOL NACL 0.9% IRRIG 3000ML BAG 2B7477

## (undated) DEVICE — TUBING EXTENSION SET 20" B1327

## (undated) DEVICE — TUBING SET THERMEDX UROLOGY SGL USE LL0006

## (undated) DEVICE — COVER PROBE ULTRASOUND 3D W/GEL 5X96" LF 20-P3D596

## (undated) DEVICE — CUSTOM PACK CYSTO PREFERRED SOT5BCYHEA

## (undated) DEVICE — CATH TRAY FOLEY SURESTEP 16FR WDRAIN BAG STLK LATEX A300316A

## (undated) DEVICE — SUCTION MANIFOLD NEPTUNE 2 SYS 1 PORT 702-025-000

## (undated) DEVICE — PREP CHLORAPREP 26ML TINTED HI-LITE ORANGE 930815

## (undated) DEVICE — TUBING IRRIG TUR Y TYPE 96" LF 6543-01

## (undated) DEVICE — CATH ANGIO JB1 SOFT-VU 5FRX65CM MARINER 11734101

## (undated) DEVICE — GUIDE NEEDLE BK ULTRASOUND 40 X 31 X 39MM UA0013

## (undated) DEVICE — SU CHROMIC 4-0 SH 27" G121H

## (undated) DEVICE — GUIDEWIRE AMPLATZ SUPER STIFF .035 X 145CM M0066401080

## (undated) DEVICE — NDL COUNTER 40CT  31142311

## (undated) DEVICE — KIT CATH BALLOON NEPHROMAX 24FRX12CM M0062101600

## (undated) DEVICE — SPECIMEN CONTAINER 5OZ STERILE 2600SA

## (undated) DEVICE — PREP DYNA-HEX 4% CHG SCRUB 4OZ BOTTLE MDS098710

## (undated) RX ORDER — FENTANYL CITRATE 50 UG/ML
INJECTION, SOLUTION INTRAMUSCULAR; INTRAVENOUS
Status: DISPENSED
Start: 2024-09-12

## (undated) RX ORDER — LIDOCAINE HYDROCHLORIDE 20 MG/ML
JELLY TOPICAL
Status: DISPENSED
Start: 2024-08-14

## (undated) RX ORDER — ACETAMINOPHEN 325 MG/1
TABLET ORAL
Status: DISPENSED
Start: 2024-09-12

## (undated) RX ORDER — FENTANYL CITRATE 50 UG/ML
INJECTION, SOLUTION INTRAMUSCULAR; INTRAVENOUS
Status: DISPENSED
Start: 2024-08-14

## (undated) RX ORDER — CEFTRIAXONE SODIUM 1 G
VIAL (EA) INJECTION
Status: DISPENSED
Start: 2024-09-12

## (undated) RX ORDER — HYDROMORPHONE HYDROCHLORIDE 1 MG/ML
INJECTION, SOLUTION INTRAMUSCULAR; INTRAVENOUS; SUBCUTANEOUS
Status: DISPENSED
Start: 2024-09-12

## (undated) RX ORDER — DEXAMETHASONE SODIUM PHOSPHATE 4 MG/ML
INJECTION, SOLUTION INTRA-ARTICULAR; INTRALESIONAL; INTRAMUSCULAR; INTRAVENOUS; SOFT TISSUE
Status: DISPENSED
Start: 2024-09-12

## (undated) RX ORDER — BUPIVACAINE HYDROCHLORIDE 2.5 MG/ML
INJECTION, SOLUTION EPIDURAL; INFILTRATION; INTRACAUDAL
Status: DISPENSED
Start: 2024-09-12